# Patient Record
Sex: FEMALE | Race: BLACK OR AFRICAN AMERICAN | NOT HISPANIC OR LATINO | URBAN - METROPOLITAN AREA
[De-identification: names, ages, dates, MRNs, and addresses within clinical notes are randomized per-mention and may not be internally consistent; named-entity substitution may affect disease eponyms.]

---

## 2020-08-25 ENCOUNTER — EMERGENCY (EMERGENCY)
Facility: HOSPITAL | Age: 60
LOS: 0 days | Discharge: HOME | End: 2020-08-26
Attending: STUDENT IN AN ORGANIZED HEALTH CARE EDUCATION/TRAINING PROGRAM
Payer: MEDICAID

## 2020-08-25 VITALS
HEART RATE: 102 BPM | TEMPERATURE: 99 F | DIASTOLIC BLOOD PRESSURE: 112 MMHG | OXYGEN SATURATION: 98 % | SYSTOLIC BLOOD PRESSURE: 182 MMHG | RESPIRATION RATE: 18 BRPM

## 2020-08-25 VITALS
SYSTOLIC BLOOD PRESSURE: 190 MMHG | WEIGHT: 197.98 LBS | HEIGHT: 64 IN | RESPIRATION RATE: 17 BRPM | HEART RATE: 106 BPM | DIASTOLIC BLOOD PRESSURE: 106 MMHG | OXYGEN SATURATION: 97 % | TEMPERATURE: 99 F

## 2020-08-25 PROCEDURE — 99284 EMERGENCY DEPT VISIT MOD MDM: CPT

## 2020-08-25 NOTE — ED ADULT TRIAGE NOTE - CHIEF COMPLAINT QUOTE
sciatica related pain, lower back pain radiating to left hip/leg  hx of sciatica, dm, htn, arthritis

## 2020-08-26 ENCOUNTER — EMERGENCY (EMERGENCY)
Facility: HOSPITAL | Age: 60
LOS: 1 days | Discharge: HOME | End: 2020-08-26
Attending: EMERGENCY MEDICINE | Admitting: EMERGENCY MEDICINE
Payer: MEDICAID

## 2020-08-26 VITALS
RESPIRATION RATE: 16 BRPM | HEART RATE: 102 BPM | TEMPERATURE: 98 F | OXYGEN SATURATION: 100 % | SYSTOLIC BLOOD PRESSURE: 174 MMHG | DIASTOLIC BLOOD PRESSURE: 112 MMHG

## 2020-08-26 PROCEDURE — 99283 EMERGENCY DEPT VISIT LOW MDM: CPT | Mod: 25

## 2020-08-26 PROCEDURE — 72131 CT LUMBAR SPINE W/O DYE: CPT | Mod: 26

## 2020-08-26 PROCEDURE — 20552 NJX 1/MLT TRIGGER POINT 1/2: CPT

## 2020-08-26 RX ORDER — METHOCARBAMOL 500 MG/1
750 TABLET, FILM COATED ORAL ONCE
Refills: 0 | Status: COMPLETED | OUTPATIENT
Start: 2020-08-26 | End: 2020-08-26

## 2020-08-26 RX ORDER — MELOXICAM 15 MG/1
1 TABLET ORAL
Qty: 7 | Refills: 0
Start: 2020-08-26 | End: 2020-09-01

## 2020-08-26 RX ORDER — KETOROLAC TROMETHAMINE 30 MG/ML
15 SYRINGE (ML) INJECTION ONCE
Refills: 0 | Status: COMPLETED | OUTPATIENT
Start: 2020-08-26 | End: 2020-08-26

## 2020-08-26 RX ORDER — DEXAMETHASONE 0.5 MG/5ML
4 ELIXIR ORAL ONCE
Refills: 0 | Status: COMPLETED | OUTPATIENT
Start: 2020-08-26 | End: 2020-08-26

## 2020-08-26 RX ORDER — LIDOCAINE 4 G/100G
1 CREAM TOPICAL ONCE
Refills: 0 | Status: COMPLETED | OUTPATIENT
Start: 2020-08-26 | End: 2020-08-26

## 2020-08-26 RX ORDER — LIDOCAINE 4 G/100G
1 CREAM TOPICAL
Qty: 7 | Refills: 0
Start: 2020-08-26 | End: 2020-09-01

## 2020-08-26 RX ORDER — KETOROLAC TROMETHAMINE 30 MG/ML
30 SYRINGE (ML) INJECTION ONCE
Refills: 0 | Status: DISCONTINUED | OUTPATIENT
Start: 2020-08-26 | End: 2020-08-26

## 2020-08-26 RX ADMIN — METHOCARBAMOL 750 MILLIGRAM(S): 500 TABLET, FILM COATED ORAL at 00:49

## 2020-08-26 RX ADMIN — Medication 30 MILLIGRAM(S): at 00:52

## 2020-08-26 RX ADMIN — Medication 4 MILLIGRAM(S): at 00:50

## 2020-08-26 RX ADMIN — LIDOCAINE 1 PATCH: 4 CREAM TOPICAL at 00:50

## 2020-08-26 NOTE — ED ADULT NURSE NOTE - OBJECTIVE STATEMENT
60F aaox4 ambulatory with h/o DM and HTN, p/w c/o left buttocks pain radiating to left thigh and leg for 4 dyas now not releived by OTC pain meds. Patient denies any trauma, no chills or fever, sob, chest pain, nausea or vomiting.

## 2020-08-26 NOTE — ED PROVIDER NOTE - PHYSICAL EXAMINATION
GEN: Awake, alert, interactive, pt appears uncomfortable.  HEAD AND NECK: NC/AT. Airway patent. Neck supple.   EYES:  Clear b/l. EOMI. PERRL.   ENT: Moist mucus membranes.   CARDIAC: Regular rate, regular rhythm. No evident pedal edema.    RESP/CHEST: Normal respiratory effort with no use of accessory muscles or retractions. Clear throughout on auscultation.  ABD: soft, non-distended, non-tender. No rebound, no guarding.   BACK: Full ROM back, + TTP lumbar spine / L SI joint.  EXTREMITIES: Moving all extremities with no apparent deformities.   SKIN: Warm, dry, intact normal color. No rash.   NEURO: AOx3, CN II-XII grossly intact, 5/5 strength BUE/BLE, sensation intact, normal gait.   PSYCH: Appropriate mood and affect.

## 2020-08-26 NOTE — ED PROVIDER NOTE - OBJECTIVE STATEMENT
61yo F w/ PMH htn, dm, back issues (f/u w/ epidural for pain /injections x 2, relief for short term) pw L lower back pain w/ rad into LLE x 3 days. Pt reports heavy lifting (groceries) 5 days ago. ros otherwise neg. pt able to ambulate, no red flags / signs sx for cauda equina. pt went to PCP gave robaxin, valium, gabapentin, no relief.     pmh as above, psh hysterectomy, , meds as listed, nkda. neg social.   sciatica type pain. 61yo F w/ PMH HTN, DM, back issues (outpt prior epidural for pain / injections x 2, relief for short term) pw L lower back pain w/ rad into LLE x 3 days. Describes as shooting, sharp pain. Pt reports heavy lifting (groceries) 5 days ago. ROS otherwise neg. Pt able to ambulate. Pt went to PCP - gave Robaxin, valium, gabapentin, no relief.     pmh as above, psh hysterectomy, , meds as listed, nkda. neg social.

## 2020-08-26 NOTE — ED PROVIDER NOTE - NS ED ROS FT
CONST: no fevers, no chills, no trauma  EYES: no pain, no visual disturbances  ENT: no sore throat, no epistaxis, no rhinorrhea, no hearing changes  CV: no chest pain, no palpitations, no orthopnea, no extremity pain or swelling  RESP: no shortness of breath, no cough, no sputum, no pleurisy, no wheezing  ABD: no abdominal pain, no nausea, no vomiting, no diarrhea, no black or bloody stool  : no dysuria, no hematuria, no frequency, no urgency  MSK: + back pain w/ rad into LLE, no neck pain  NEURO: no headache, no sensory disturbances, no focal weakness, no dizziness  HEME: no easy bleeding or bruising  SKIN: no diaphoresis, no rash

## 2020-08-26 NOTE — ED PROVIDER NOTE - CLINICAL SUMMARY MEDICAL DECISION MAKING FREE TEXT BOX
61yo F w/ PMH HTN, DM, back issues pw L lower back pain w/ rad into LLE x 3 days. ROS otherwise neg. Pt able to ambulate, no red flags / signs sx for cauda equina. AFVSS. Pt appears uncomfortable, + TTP lumbar spine / L SI joint, no focal neuro deficits on exam. Plan: Obtain CT lumbar, give pain meds, re-eval. CT w/ mild bony foraminal narrowing L3-4-5. On re-eval, pt pain improved. Stable for d/c home. Clinical presentation most c/w L sciatic pain. Given outpt f/u w/ spinal surgery, if desired. Recommend f/u w/ PCP. Given script lidoderm patches, meloxicam prn severe pain. Return signs and sx d/w pt and her daughter. They understand and agree w/ this plan.

## 2020-08-26 NOTE — ED ADULT TRIAGE NOTE - CHIEF COMPLAINT QUOTE
pt brought in by ems from home, here for evaluation of left sided hip pain radiating down the leg, describes pain as burning. pt now unable to bear weight. pt was diagnosed with sciatica yesterday while in another ED with similar symptoms. Kayleigh-daughter [phone]

## 2020-08-26 NOTE — ED PROVIDER NOTE - CARE PROVIDER_API CALL
Daly Hernandez  ORTHOPAEDIC SURGERY  [address] [facility], [address] [place] [address]  Phone: [phone]  Fax: [phone]  Follow Up Time: 4-6 Days

## 2020-08-26 NOTE — ED PROVIDER NOTE - PATIENT PORTAL LINK FT
You can access the FollowMyHealth Patient Portal offered by [facility] by registering at the following website: [email or web address]. By joining [employer]’s FollowMyHealth portal, you will also be able to view your health information using other applications (apps) compatible with our system.

## 2020-08-26 NOTE — ED ADULT NURSE NOTE - CHPI ED NUR SYMPTOMS NEG
no tingling/no fever/no vomiting/no dizziness/no nausea/no decreased eating/drinking/no chills/no weakness

## 2020-08-27 VITALS
OXYGEN SATURATION: 100 % | RESPIRATION RATE: 18 BRPM | HEART RATE: 86 BPM | DIASTOLIC BLOOD PRESSURE: 64 MMHG | SYSTOLIC BLOOD PRESSURE: 167 MMHG | TEMPERATURE: 98 F
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
## 2020-08-27 DIAGNOSIS — E11.9 TYPE 2 DIABETES MELLITUS WITHOUT COMPLICATIONS: ICD-10-CM

## 2020-08-27 DIAGNOSIS — I10 ESSENTIAL (PRIMARY) HYPERTENSION: ICD-10-CM

## 2020-08-27 DIAGNOSIS — M54.5 LOW BACK PAIN: ICD-10-CM

## 2020-08-27 DIAGNOSIS — M54.42 LUMBAGO WITH SCIATICA, LEFT SIDE: ICD-10-CM

## 2020-08-27 RX ORDER — BUPIVACAINE HCL/PF 7.5 MG/ML
5 VIAL (ML) INJECTION ONCE
Refills: 0 | Status: COMPLETED | OUTPATIENT
Start: 2020-08-27 | End: 2020-08-27

## 2020-08-27 RX ORDER — DIAZEPAM 5 MG
5 TABLET ORAL ONCE
Refills: 0 | Status: DISCONTINUED | OUTPATIENT
Start: 2020-08-27 | End: 2020-08-27

## 2020-08-27 RX ORDER — LIDOCAINE 4 G/100G
1 CREAM TOPICAL ONCE
Refills: 0 | Status: COMPLETED | OUTPATIENT
Start: 2020-08-27 | End: 2020-08-27

## 2020-08-27 RX ADMIN — Medication 5 MILLILITER(S): at 04:51

## 2020-08-27 RX ADMIN — LIDOCAINE 1 PATCH: 4 CREAM TOPICAL at 05:09

## 2020-08-27 RX ADMIN — Medication 5 MILLIGRAM(S): at 04:51

## 2020-08-27 NOTE — ED PROVIDER NOTE - PROGRESS NOTE DETAILS
Melvi Mathis MD PGY-3 patient reports iproved pain. ambulating in ER. will d/c with follow up. she may need to have her gabapentin uptitrated as an outpatient

## 2020-08-27 NOTE — ED PROVIDER NOTE - OBJECTIVE STATEMENT
61 yo F with PMH HTN, DM on insulin p/w L lower back pain radiating down left leg associated with burning. Was started on roboxan and seen at [facility] for pain recently, given "two injections" and two pills (patient unsure of name, but states her daughter who is a nurse states one was a steroid). Worse pain over the last two days, now unable to ambulate. No bowel or bladder incontinence or reported saddle anesthesia.

## 2020-08-27 NOTE — ED PROVIDER NOTE - PATIENT PORTAL LINK FT
You can access the FollowMyHealth Patient Portal offered by [facility] by registering at the following website: [email or web address]. By joining [employer]’s FollowMyHealth portal, you will also be able to view your health information using other applications (apps) compatible with our system.

## 2020-08-27 NOTE — ED PROVIDER NOTE - PHYSICAL EXAMINATION
PHYSICAL EXAM:   General: appears in discomfort  HEENT: NC/AT, airway patent  Cardiovascular: regular rate  Respiratory: nonlabored respirations  Back: left lower back pain over SI joint with palpable muscle spasm, no overlying skin lesions  Neuro: awake, alert, interactive. Strength 5/5 lower extremities b/l. Sensation intact to light touch in lower extremities. +good cap refil in LLE. dorsiflexion and plantarflexion of the feet intact bilaterally  Skin: as above  -Melvi Mathis PGY-2 PHYSICAL EXAM:   General: appears in discomfort  HEENT: NC/AT, airway patent  Cardiovascular: regular rate  Respiratory: nonlabored respirations  Back: left lower back pain over SI joint with palpable muscle spasm, no overlying skin lesions  Neuro: awake, alert, interactive. Strength 5/5 RLE, difficulty lifting LLE off the bed 2/2 pain. Sensation intact to light touch in lower extremities. +good cap refil in LLE. dorsiflexion and plantarflexion of the feet intact bilaterally  Skin: as above  -Melvi Mathis PGY-2

## 2020-08-27 NOTE — ED PROVIDER NOTE - NS ED ROS FT
REVIEW OF SYSTEMS:  General:  no fever, no chills  Cardiac: no chest pain  Respiratory:  no shortness of breath  Gastrointestinal: no abdominal pain, no nausea, no vomiting, no diarrhea, no melena, no hematochezia   Genitourinary: no hematuria, no dysuria, no urinary frequency, no urinary hesitancy  Extremities: +L lower back pain radiating down the left leg  Neuro: no bowel or bladder incontinence or saddle anesthesia reported  -Melvi Mathis PGY-2

## 2020-08-27 NOTE — ED ADULT NURSE NOTE - CHIEF COMPLAINT QUOTE
pt brought in by ems from home, here for evaluation of left sided hip pain radiating down the leg, describes pain as burning. pt now unable to bear weight. pt was diagnosed with sciatica yesterday while in another ED with similar symptoms. Kayleigh-daughter [phone]

## 2020-08-27 NOTE — ED PROVIDER NOTE - NSFOLLOWUPINSTRUCTIONS_ED_ALL_ED_FT
1. You were seen in the Emergency Room for sciatica pain  2. You should continue to take all your medications as prescribed. You may need to have the dose of your gabapentin increased.   3. Please follow up with your doctor in 24-48 hours.  4. Return to the emergency room or seek immediate assistance for any new or concerning symptoms (such as fevers, chills, worsening back pain, inability to walk, unable to control your bowel or bladder ), or if you get worse.   5. Copies of your tests were provided to you for follow-up.  You must address all your findings with your doctor.

## 2020-08-27 NOTE — ED PROVIDER NOTE - CLINICAL SUMMARY MEDICAL DECISION MAKING FREE TEXT BOX
61 yo F with PMH HTN, DM on insulin p/w L lower back pain radiating down left leg associated with burning likely sciatica with nerve irritation from muscle spasm. No red flags for back pain. Will treat pain with trigger point injection and valium with lidoderm patch and re-eval. Will ensure patient is ambulatory prior to d/c

## 2020-08-27 NOTE — ED PROVIDER NOTE - ATTENDING CONTRIBUTION TO CARE
I performed a history and physical exam of the patient and discussed their management with the resident. I reviewed the resident's note and agree with the documented findings and plan of care. I have edited as appropriate. My medical decision making and observations are found above.  61 yo F with PMH HTN, DM on insulin p/w L lower back pain radiating down left leg associated with burning. Was started on roboxan and seen at [facility] for pain recently, given "two injections" and two pills (patient unsure of name, but states her daughter who is a nurse states one was a steroid). Worse pain over the last two days, now unable to ambulate. No bowel or bladder incontinence or reported saddle anesthesia.

## 2020-08-27 NOTE — ED ADULT NURSE NOTE - OBJECTIVE STATEMENT
April RN:  Pt received in spot 2, A&OX4, NAD.  c/o L leg pain since Sunday, radiating from sacrum down to L foot with tingling to toes.  Pt was seen by PMD earlier in week, placed on a muscle relaxer, unknown name, without relief.  Pt evaluated then evaluated at [facility] yesterday and given decadron without relief.  Pt states is unable to bear weight because pain is "too bad,"  but has full ROM to affected extremity, bending her knee to her chest in the stretcher without difficulty or help.  Pt already on robaxin and gabapentin for neuropathy pain. Awaiting MD musa.  Report given to primary RN.

## 2022-03-10 ENCOUNTER — EMERGENCY (EMERGENCY)
Facility: HOSPITAL | Age: 62
LOS: 0 days | Discharge: HOME | End: 2022-03-11
Attending: STUDENT IN AN ORGANIZED HEALTH CARE EDUCATION/TRAINING PROGRAM
Payer: MEDICAID

## 2022-03-10 VITALS
DIASTOLIC BLOOD PRESSURE: 76 MMHG | TEMPERATURE: 98 F | WEIGHT: 203.05 LBS | SYSTOLIC BLOOD PRESSURE: 178 MMHG | HEART RATE: 101 BPM | HEIGHT: 64 IN | OXYGEN SATURATION: 99 % | RESPIRATION RATE: 16 BRPM

## 2022-03-10 DIAGNOSIS — R74.8 ABNORMAL LEVELS OF OTHER SERUM ENZYMES: ICD-10-CM

## 2022-03-10 DIAGNOSIS — R06.02 SHORTNESS OF BREATH: ICD-10-CM

## 2022-03-10 DIAGNOSIS — R10.12 LEFT UPPER QUADRANT PAIN: ICD-10-CM

## 2022-03-10 DIAGNOSIS — Z90.710 ACQUIRED ABSENCE OF BOTH CERVIX AND UTERUS: ICD-10-CM

## 2022-03-10 DIAGNOSIS — I10 ESSENTIAL (PRIMARY) HYPERTENSION: ICD-10-CM

## 2022-03-10 DIAGNOSIS — E11.9 TYPE 2 DIABETES MELLITUS WITHOUT COMPLICATIONS: ICD-10-CM

## 2022-03-10 PROCEDURE — 99285 EMERGENCY DEPT VISIT HI MDM: CPT

## 2022-03-10 RX ORDER — FAMOTIDINE 10 MG/ML
20 INJECTION INTRAVENOUS ONCE
Refills: 0 | Status: COMPLETED | OUTPATIENT
Start: 2022-03-10 | End: 2022-03-10

## 2022-03-10 RX ADMIN — Medication 30 MILLILITER(S): at 23:25

## 2022-03-10 RX ADMIN — FAMOTIDINE 20 MILLIGRAM(S): 10 INJECTION INTRAVENOUS at 23:25

## 2022-03-10 NOTE — ED PROVIDER NOTE - NSFOLLOWUPCLINICS_GEN_ALL_ED_FT
A Gastroenterologist  Gastroenterology  .  [place]   Phone:   Fax:   Established Patient  Follow Up Time: 7-10 Days

## 2022-03-10 NOTE — ED PROVIDER NOTE - PROGRESS NOTE DETAILS
Pt w/ improvement in symptoms w/ ED meds, lipase ~900, pt reports CT AP was ~1mo ago, will add on CT AP r/o pancreatitis. Pt w/ improvement in symptoms w/ ED meds. Lipase 889. Pt reports CT AP was ~1mo ago, will add on CT AP r/o pancreatitis. CT negative for acute intra-abd pathology. Stable for d/c home. Given scripts Pepcid, Maalox. Instructed for close outpatient GI f/u, as scheduled + PCP f/u. Return signs / symptoms d/w pt. She understands and agrees w/ this plan.

## 2022-03-10 NOTE — ED PROVIDER NOTE - PATIENT PORTAL LINK FT
You can access the FollowMyHealth Patient Portal offered by [facility] by registering at the following website: [email or web address]. By joining [employer]’s FollowMyHealth portal, you will also be able to view your health information using other applications (apps) compatible with our system.

## 2022-03-10 NOTE — ED PROVIDER NOTE - CLINICAL SUMMARY MEDICAL DECISION MAKING FREE TEXT BOX
61F w/ PMH HTN, DM pw 1mo LUQ pain, worse tonight. AFVSS. Well appearing, in NAD. Plan: CBC, CMP, lipase. Give Pepcid, Maalox. Re-eval. If negative w/u, anticipate d/c home w/ instructions for close outpatient GI f/u, as scheduled.

## 2022-03-10 NOTE — ED PROVIDER NOTE - OBJECTIVE STATEMENT
61F w/ PMH HTN, DM pw LUQ pain x 1mo, worse tonight. Reports followed up w/ PCP, had outpatient CT AP done which was negative, pt had GI appt scheduled for last wk, but d/t paperwork issue, appt was moved to next wk. Pt trying mint and rubio teas w/o relief, no meds PTA. Denies previous similar pain. Pain does not radiate. Denies F/C, CP, cough, back pain, N/V/D/C, dysuria, hematuria. Pt reports SOB w/ severe pain. Pt reports moves bowels x 1 daily, regular.     PMH as above, PSH hysterectomy, NKDA, meds as listed.

## 2022-03-10 NOTE — ED ADULT NURSE NOTE - OBJECTIVE STATEMENT
Pt presents to the ED c/o of worsening upper ABD pain. Pt that pain has been going on and off for 1 month. Pt denies any N/V.

## 2022-03-11 VITALS
HEART RATE: 85 BPM | RESPIRATION RATE: 17 BRPM | OXYGEN SATURATION: 98 % | DIASTOLIC BLOOD PRESSURE: 76 MMHG | SYSTOLIC BLOOD PRESSURE: 123 MMHG

## 2022-03-11 PROBLEM — I10 ESSENTIAL (PRIMARY) HYPERTENSION: Chronic | Status: ACTIVE | Noted: 2020-08-27

## 2022-03-11 PROBLEM — E11.9 TYPE 2 DIABETES MELLITUS WITHOUT COMPLICATIONS: Chronic | Status: ACTIVE | Noted: 2020-08-27

## 2022-03-11 LAB
ALBUMIN SERPL ELPH-MCNC: 3.4 G/DL — SIGNIFICANT CHANGE UP (ref 3.3–5)
ALP SERPL-CCNC: 108 U/L — SIGNIFICANT CHANGE UP (ref 40–120)
ALT FLD-CCNC: 35 U/L — SIGNIFICANT CHANGE UP (ref 12–78)
ANION GAP SERPL CALC-SCNC: 3 MMOL/L — LOW (ref 5–17)
AST SERPL-CCNC: 20 U/L — SIGNIFICANT CHANGE UP (ref 15–37)
BILIRUB SERPL-MCNC: 0.2 MG/DL — SIGNIFICANT CHANGE UP (ref 0.2–1.2)
BUN SERPL-MCNC: 15 MG/DL — SIGNIFICANT CHANGE UP (ref 7–23)
CALCIUM SERPL-MCNC: 8.9 MG/DL — SIGNIFICANT CHANGE UP (ref 8.5–10.1)
CHLORIDE SERPL-SCNC: 106 MMOL/L — SIGNIFICANT CHANGE UP (ref 96–108)
CO2 SERPL-SCNC: 31 MMOL/L — SIGNIFICANT CHANGE UP (ref 22–31)
CREAT SERPL-MCNC: 1.07 MG/DL — SIGNIFICANT CHANGE UP (ref 0.5–1.3)
EGFR: 58 ML/MIN/1.73M2 — LOW
GLUCOSE SERPL-MCNC: 395 MG/DL — HIGH (ref 70–99)
HCT VFR BLD CALC: 35.3 % — SIGNIFICANT CHANGE UP (ref 34.5–45)
HGB BLD-MCNC: 11.6 G/DL — SIGNIFICANT CHANGE UP (ref 11.5–15.5)
LIDOCAIN IGE QN: 889 U/L — HIGH (ref 73–393)
MCHC RBC-ENTMCNC: 27.2 PG — SIGNIFICANT CHANGE UP (ref 27–34)
MCHC RBC-ENTMCNC: 32.9 G/DL — SIGNIFICANT CHANGE UP (ref 32–36)
MCV RBC AUTO: 82.7 FL — SIGNIFICANT CHANGE UP (ref 80–100)
NRBC # BLD: 0 /100 WBCS — SIGNIFICANT CHANGE UP (ref 0–0)
PLATELET # BLD AUTO: 311 K/UL — SIGNIFICANT CHANGE UP (ref 150–400)
POTASSIUM SERPL-MCNC: 3.9 MMOL/L — SIGNIFICANT CHANGE UP (ref 3.5–5.3)
POTASSIUM SERPL-SCNC: 3.9 MMOL/L — SIGNIFICANT CHANGE UP (ref 3.5–5.3)
PROT SERPL-MCNC: 7 GM/DL — SIGNIFICANT CHANGE UP (ref 6–8.3)
RBC # BLD: 4.27 M/UL — SIGNIFICANT CHANGE UP (ref 3.8–5.2)
RBC # FLD: 14.2 % — SIGNIFICANT CHANGE UP (ref 10.3–14.5)
SODIUM SERPL-SCNC: 140 MMOL/L — SIGNIFICANT CHANGE UP (ref 135–145)
WBC # BLD: 6.15 K/UL — SIGNIFICANT CHANGE UP (ref 3.8–10.5)
WBC # FLD AUTO: 6.15 K/UL — SIGNIFICANT CHANGE UP (ref 3.8–10.5)

## 2022-03-11 PROCEDURE — 74177 CT ABD & PELVIS W/CONTRAST: CPT | Mod: 26,MC

## 2022-03-11 RX ORDER — FAMOTIDINE 10 MG/ML
1 INJECTION INTRAVENOUS
Qty: 30 | Refills: 0
Start: 2022-03-11 | End: 2022-04-09

## 2023-02-20 ENCOUNTER — EMERGENCY (EMERGENCY)
Facility: HOSPITAL | Age: 63
LOS: 0 days | Discharge: HOME | End: 2023-02-20
Attending: STUDENT IN AN ORGANIZED HEALTH CARE EDUCATION/TRAINING PROGRAM
Payer: MEDICAID

## 2023-02-20 VITALS
SYSTOLIC BLOOD PRESSURE: 158 MMHG | HEART RATE: 71 BPM | RESPIRATION RATE: 18 BRPM | OXYGEN SATURATION: 97 % | DIASTOLIC BLOOD PRESSURE: 77 MMHG

## 2023-02-20 VITALS
DIASTOLIC BLOOD PRESSURE: 83 MMHG | WEIGHT: 203.05 LBS | OXYGEN SATURATION: 98 % | HEART RATE: 81 BPM | HEIGHT: 64 IN | RESPIRATION RATE: 18 BRPM | SYSTOLIC BLOOD PRESSURE: 131 MMHG | TEMPERATURE: 98 F

## 2023-02-20 DIAGNOSIS — R14.0 ABDOMINAL DISTENSION (GASEOUS): ICD-10-CM

## 2023-02-20 DIAGNOSIS — R10.11 RIGHT UPPER QUADRANT PAIN: ICD-10-CM

## 2023-02-20 DIAGNOSIS — I10 ESSENTIAL (PRIMARY) HYPERTENSION: ICD-10-CM

## 2023-02-20 DIAGNOSIS — E11.9 TYPE 2 DIABETES MELLITUS WITHOUT COMPLICATIONS: ICD-10-CM

## 2023-02-20 DIAGNOSIS — R10.13 EPIGASTRIC PAIN: ICD-10-CM

## 2023-02-20 DIAGNOSIS — K29.70 GASTRITIS, UNSPECIFIED, WITHOUT BLEEDING: ICD-10-CM

## 2023-02-20 LAB
ALBUMIN SERPL ELPH-MCNC: 3.5 G/DL — SIGNIFICANT CHANGE UP (ref 3.3–5)
ALP SERPL-CCNC: 103 U/L — SIGNIFICANT CHANGE UP (ref 40–120)
ALT FLD-CCNC: 55 U/L — SIGNIFICANT CHANGE UP (ref 12–78)
ANION GAP SERPL CALC-SCNC: 4 MMOL/L — LOW (ref 5–17)
AST SERPL-CCNC: 44 U/L — HIGH (ref 15–37)
BASOPHILS # BLD AUTO: 0.04 K/UL — SIGNIFICANT CHANGE UP (ref 0–0.2)
BASOPHILS NFR BLD AUTO: 0.7 % — SIGNIFICANT CHANGE UP (ref 0–2)
BILIRUB SERPL-MCNC: 0.3 MG/DL — SIGNIFICANT CHANGE UP (ref 0.2–1.2)
BUN SERPL-MCNC: 10 MG/DL — SIGNIFICANT CHANGE UP (ref 7–23)
CALCIUM SERPL-MCNC: 8.9 MG/DL — SIGNIFICANT CHANGE UP (ref 8.5–10.1)
CHLORIDE SERPL-SCNC: 108 MMOL/L — SIGNIFICANT CHANGE UP (ref 96–108)
CO2 SERPL-SCNC: 28 MMOL/L — SIGNIFICANT CHANGE UP (ref 22–31)
CREAT SERPL-MCNC: 0.7 MG/DL — SIGNIFICANT CHANGE UP (ref 0.5–1.3)
EGFR: 97 ML/MIN/1.73M2 — SIGNIFICANT CHANGE UP
EOSINOPHIL # BLD AUTO: 0.18 K/UL — SIGNIFICANT CHANGE UP (ref 0–0.5)
EOSINOPHIL NFR BLD AUTO: 3.2 % — SIGNIFICANT CHANGE UP (ref 0–6)
GLUCOSE SERPL-MCNC: 200 MG/DL — HIGH (ref 70–99)
HCT VFR BLD CALC: 38.1 % — SIGNIFICANT CHANGE UP (ref 34.5–45)
HGB BLD-MCNC: 12.3 G/DL — SIGNIFICANT CHANGE UP (ref 11.5–15.5)
IMM GRANULOCYTES NFR BLD AUTO: 0 % — SIGNIFICANT CHANGE UP (ref 0–0.9)
LIDOCAIN IGE QN: 186 U/L — SIGNIFICANT CHANGE UP (ref 73–393)
LYMPHOCYTES # BLD AUTO: 2.42 K/UL — SIGNIFICANT CHANGE UP (ref 1–3.3)
LYMPHOCYTES # BLD AUTO: 42.6 % — SIGNIFICANT CHANGE UP (ref 13–44)
MCHC RBC-ENTMCNC: 27.2 PG — SIGNIFICANT CHANGE UP (ref 27–34)
MCHC RBC-ENTMCNC: 32.3 G/DL — SIGNIFICANT CHANGE UP (ref 32–36)
MCV RBC AUTO: 84.3 FL — SIGNIFICANT CHANGE UP (ref 80–100)
MONOCYTES # BLD AUTO: 0.45 K/UL — SIGNIFICANT CHANGE UP (ref 0–0.9)
MONOCYTES NFR BLD AUTO: 7.9 % — SIGNIFICANT CHANGE UP (ref 2–14)
NEUTROPHILS # BLD AUTO: 2.59 K/UL — SIGNIFICANT CHANGE UP (ref 1.8–7.4)
NEUTROPHILS NFR BLD AUTO: 45.6 % — SIGNIFICANT CHANGE UP (ref 43–77)
NRBC # BLD: 0 /100 WBCS — SIGNIFICANT CHANGE UP (ref 0–0)
PLATELET # BLD AUTO: 360 K/UL — SIGNIFICANT CHANGE UP (ref 150–400)
POTASSIUM SERPL-MCNC: 4.2 MMOL/L — SIGNIFICANT CHANGE UP (ref 3.5–5.3)
POTASSIUM SERPL-SCNC: 4.2 MMOL/L — SIGNIFICANT CHANGE UP (ref 3.5–5.3)
PROT SERPL-MCNC: 7.4 GM/DL — SIGNIFICANT CHANGE UP (ref 6–8.3)
RBC # BLD: 4.52 M/UL — SIGNIFICANT CHANGE UP (ref 3.8–5.2)
RBC # FLD: 14.4 % — SIGNIFICANT CHANGE UP (ref 10.3–14.5)
SODIUM SERPL-SCNC: 140 MMOL/L — SIGNIFICANT CHANGE UP (ref 135–145)
TROPONIN I, HIGH SENSITIVITY RESULT: 5.3 NG/L — SIGNIFICANT CHANGE UP
WBC # BLD: 5.68 K/UL — SIGNIFICANT CHANGE UP (ref 3.8–10.5)
WBC # FLD AUTO: 5.68 K/UL — SIGNIFICANT CHANGE UP (ref 3.8–10.5)

## 2023-02-20 PROCEDURE — 99285 EMERGENCY DEPT VISIT HI MDM: CPT

## 2023-02-20 PROCEDURE — 93010 ELECTROCARDIOGRAM REPORT: CPT

## 2023-02-20 PROCEDURE — 76705 ECHO EXAM OF ABDOMEN: CPT | Mod: 26

## 2023-02-20 RX ORDER — FAMOTIDINE 10 MG/ML
20 INJECTION INTRAVENOUS ONCE
Refills: 0 | Status: COMPLETED | OUTPATIENT
Start: 2023-02-20 | End: 2023-02-20

## 2023-02-20 RX ADMIN — FAMOTIDINE 20 MILLIGRAM(S): 10 INJECTION INTRAVENOUS at 15:29

## 2023-02-20 NOTE — ED PROVIDER NOTE - OBJECTIVE STATEMENT
61 yo F PMH HTN, DM, gastritis on protonix, here with referral from her GI doc for ruq sono r/o cholecystitis. Pt states her GI doc already did endoscopy/colonoscopy, CT, fluoroscopy studies which were negative? Pt continues with upper abdominal discomfort and bloating with ruq pain radiating to back. Pain worse when eating and has been ongoing for months. Pt states also gets sour taste in mouth frequently. Pts EKG 67 bpm nsr without ischemic change or arrhythmia.

## 2023-02-20 NOTE — ED PROVIDER NOTE - PATIENT PORTAL LINK FT
You can access the FollowMyHealth Patient Portal offered by [facility] by registering at the following website: [email or web address]. By joining [employer]’s FollowMyHealth portal, you will also be able to view your health information using other applications (apps) compatible with our system.

## 2023-02-20 NOTE — ED ADULT NURSE NOTE - OBJECTIVE STATEMENT
Pt axox4 presents to the ED c/o sharp epigastric pain that radiates to back [id number]+ and mild constipation. Pt states that she came in today for worsening constant pain. Denies any n/v/d, fever. States her PCP gave her Protonix to help with the pain but denies any relief. States the she notices she gets more pain after she eats and feels like the food is just sitting there, which causes her to want to lay down. No allergies.

## 2023-02-20 NOTE — ED PROVIDER NOTE - CARE PROVIDER_API CALL
Maria Esther Zhong)  Gastroenterology; Internal Medicine  [address] [place] [address]  Phone: [phone]  Fax: [phone]  Follow Up Time:

## 2023-02-20 NOTE — ED PROVIDER NOTE - RESPIRATORY, MLM
- see plans as above   - C/w Coreg & Lasix   - C/w Strict I&O's.  Daily wt.    - Cardiology-Dr. Denton consulted, appreciated Breath sounds clear and equal bilaterally.

## 2023-02-20 NOTE — ED ADULT TRIAGE NOTE - CHIEF COMPLAINT QUOTE
pt c/o epigastric pain radiating to RUQ abdominal pain x 1.5 months. pt states had similar pain on LUQ abdominal pain x 3 month ago.

## 2023-02-20 NOTE — ED PROVIDER NOTE - PROGRESS NOTE DETAILS
Received patient signout from Dr. alvares.  Patient followed closely by GI for chronic abdominal pain negative outpatient studies and endoscopies sent for ultrasound. Patient in no acute distress or pain at this time requesting to eat.  US negative for acute pathology.

## 2023-02-20 NOTE — ED PROVIDER NOTE - CLINICAL SUMMARY MEDICAL DECISION MAKING FREE TEXT BOX
Pt with hx htn dm, here with months long of epigastric/upper abdominal pain and bloating and frequent gas, food regurgitation, sour taste in mouth. Already seen by GI doc and had extensive workup. Sent by GI for priyank murdock to r/o cholecystitis. Pt afebrile. Abdomen soft nd/nt. EKG done given age, weight, female sex, dm/htn hx and no ischemic changes seen. troponin neg. Pending mathieu

## 2023-02-20 NOTE — ED PROVIDER NOTE - NSFOLLOWUPINSTRUCTIONS_ED_ALL_ED_FT
1) Take tylenol or motrin for pain  2) Follow-up with your gastroenterologist (GI doctor)  3) Follow up with your primary care doctor  4) Return to the ER for worsening or concerning symptoms

## 2023-05-25 ENCOUNTER — OFFICE (OUTPATIENT)
Dept: URBAN - METROPOLITAN AREA CLINIC 92 | Facility: CLINIC | Age: 63
Setting detail: OPHTHALMOLOGY
End: 2023-05-25
Payer: COMMERCIAL

## 2023-05-25 ENCOUNTER — RX ONLY (RX ONLY)
Age: 63
End: 2023-05-25

## 2023-05-25 DIAGNOSIS — H40.053: ICD-10-CM

## 2023-05-25 DIAGNOSIS — H25.13: ICD-10-CM

## 2023-05-25 DIAGNOSIS — H40.013: ICD-10-CM

## 2023-05-25 DIAGNOSIS — H16.223: ICD-10-CM

## 2023-05-25 PROCEDURE — 92133 CPTRZD OPH DX IMG PST SGM ON: CPT | Performed by: SPECIALIST

## 2023-05-25 PROCEDURE — 92020 GONIOSCOPY: CPT | Performed by: SPECIALIST

## 2023-05-25 PROCEDURE — 76514 ECHO EXAM OF EYE THICKNESS: CPT | Performed by: SPECIALIST

## 2023-05-25 PROCEDURE — 92004 COMPRE OPH EXAM NEW PT 1/>: CPT | Performed by: SPECIALIST

## 2023-05-25 ASSESSMENT — KERATOMETRY
OS_CYLPOWER_DEGREES: 39.5
OD_CYLAXISANGLE_DEGREES: 021
OS_AXISANGLE_DEGREES: 7
OD_CYLPOWER_DEGREES: 1.25
OS_AXISANGLE2_DEGREES: 097
OD_AXISANGLE2_DEGREES: 021
OS_K2POWER_DIOPTERS: 46.50
OS_K1POWER_DIOPTERS: 007
OD_K1K2_AVERAGE: 46.375
OD_AXISANGLE_DEGREES: 111
OD_K1POWER_DIOPTERS: 45.75
OS_K1K2_AVERAGE: 26.75
OS_CYLAXISANGLE_DEGREES: 097
OD_K2POWER_DIOPTERS: 47.00

## 2023-05-25 ASSESSMENT — PACHYMETRY
OD_CT_CORRECTION: -4
OS_CT_UM: 600
OD_CT_UM: 604
OS_CT_CORRECTION: -4

## 2023-05-25 ASSESSMENT — SUPERFICIAL PUNCTATE KERATITIS (SPK)
OS_SPK: 2+
OD_SPK: 2+

## 2023-05-26 PROBLEM — H40.013 GLAUCOMA SUSPECT, LOW RISK 1-2 FACTORS; BOTH EYES: Status: ACTIVE | Noted: 2023-05-25

## 2023-05-26 ASSESSMENT — KERATOMETRY
METHOD_AUTO_MANUAL: AUTO
OS_AXISANGLE_DEGREES: 097
OD_AXISANGLE_DEGREES: 021
OS_K1POWER_DIOPTERS: 007
OD_K1POWER_DIOPTERS: 45.75
OS_K2POWER_DIOPTERS: 46.50
OD_K2POWER_DIOPTERS: 47.00

## 2023-05-26 ASSESSMENT — REFRACTION_CURRENTRX
OD_OVR_VA: 20/
OS_OVR_VA: 20/
OS_VPRISM_DIRECTION: BF
OD_CYLINDER: +0.75
OD_SPHERE: +0.75
OS_CYLINDER: +0.75
OD_ADD: +2.25
OD_VPRISM_DIRECTION: BF
OD_AXIS: 010
OS_SPHERE: +0.25
OS_AXIS: 172
OS_ADD: +2.25

## 2023-05-26 ASSESSMENT — REFRACTION_AUTOREFRACTION
OD_SPHERE: +0.50
OS_SPHERE: -0.75
OS_AXIS: 178
OS_CYLINDER: +3.75
OD_AXIS: 006
OD_CYLINDER: +2.75

## 2023-05-26 ASSESSMENT — AXIALLENGTH_DERIVED
OS_AL: 31.13
OD_AL: 21.9311

## 2023-05-26 ASSESSMENT — SPHEQUIV_DERIVED
OS_SPHEQUIV: 1.125
OD_SPHEQUIV: 1.875

## 2023-05-26 ASSESSMENT — VISUAL ACUITY
OD_BCVA: 20/50+2
OS_BCVA: 20/30+
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
## 2023-08-15 ENCOUNTER — OFFICE (OUTPATIENT)
Dept: URBAN - METROPOLITAN AREA CLINIC 92 | Facility: CLINIC | Age: 63
Setting detail: OPHTHALMOLOGY
End: 2023-08-15
Payer: MEDICARE

## 2023-08-15 DIAGNOSIS — H40.013: ICD-10-CM

## 2023-08-15 DIAGNOSIS — H25.13: ICD-10-CM

## 2023-08-15 DIAGNOSIS — E11.9: ICD-10-CM

## 2023-08-15 DIAGNOSIS — H40.053: ICD-10-CM

## 2023-08-15 PROBLEM — H02.402 PTOSIS OF EYELID, UNSPECIFIED; RIGHT EYE, LEFT EYE, BOTH EYES: Status: ACTIVE | Noted: 2023-08-15

## 2023-08-15 PROBLEM — H02.401 PTOSIS OF EYELID, UNSPECIFIED; RIGHT EYE, LEFT EYE, BOTH EYES: Status: ACTIVE | Noted: 2023-08-15

## 2023-08-15 PROBLEM — H02.403 PTOSIS OF EYELID, UNSPECIFIED; RIGHT EYE, LEFT EYE, BOTH EYES: Status: ACTIVE | Noted: 2023-08-15

## 2023-08-15 PROCEDURE — 92250 FUNDUS PHOTOGRAPHY W/I&R: CPT | Performed by: SPECIALIST

## 2023-08-15 PROCEDURE — 92083 EXTENDED VISUAL FIELD XM: CPT | Performed by: SPECIALIST

## 2023-08-15 PROCEDURE — 92012 INTRM OPH EXAM EST PATIENT: CPT | Performed by: SPECIALIST

## 2023-08-15 ASSESSMENT — KERATOMETRY
OD_AXISANGLE_DEGREES: 012
OS_K2POWER_DIOPTERS: 47.00
METHOD_AUTO_MANUAL: AUTO
OS_K1POWER_DIOPTERS: 46.00
OD_K2POWER_DIOPTERS: 46.50
OD_K1POWER_DIOPTERS: 45.50
OS_AXISANGLE_DEGREES: 171

## 2023-08-15 ASSESSMENT — REFRACTION_CURRENTRX
OS_ADD: +2.25
OD_OVR_VA: 20/
OS_OVR_VA: 20/
OD_AXIS: 106
OD_AXIS: 010
OS_VPRISM_DIRECTION: BF
OD_SPHERE: +0.75
OS_SPHERE: +1.00
OS_CYLINDER: +0.75
OD_ADD: +2.25
OD_CYLINDER: -0.75
OD_OVR_VA: 20/
OS_AXIS: 172
OD_CYLINDER: +0.75
OS_SPHERE: +0.25
OD_VPRISM_DIRECTION: BF
OS_ADD: +2.25
OS_OVR_VA: 20/
OD_SPHERE: +1.50
OS_AXIS: 081
OS_CYLINDER: -0.75
OD_ADD: +2.25
OD_VPRISM_DIRECTION: BF
OS_VPRISM_DIRECTION: BF

## 2023-08-15 ASSESSMENT — CONFRONTATIONAL VISUAL FIELD TEST (CVF)
OD_FINDINGS: FULL
OS_FINDINGS: FULL

## 2023-08-15 ASSESSMENT — TONOMETRY
OD_IOP_MMHG: 21
OS_IOP_MMHG: 21

## 2023-08-15 ASSESSMENT — REFRACTION_AUTOREFRACTION
OS_SPHERE: -1.00
OD_CYLINDER: +2.25
OD_SPHERE: +0.75
OS_AXIS: 172
OD_AXIS: 001
OS_CYLINDER: +2.75

## 2023-08-15 ASSESSMENT — SUPERFICIAL PUNCTATE KERATITIS (SPK)
OS_SPK: 2+
OD_SPK: 2+

## 2023-08-15 ASSESSMENT — AXIALLENGTH_DERIVED
OS_AL: 22.4066
OD_AL: 22.0508

## 2023-08-15 ASSESSMENT — PACHYMETRY
OD_CT_CORRECTION: -4
OS_CT_CORRECTION: -4
OD_CT_UM: 604
OS_CT_UM: 600

## 2023-08-15 ASSESSMENT — VISUAL ACUITY
OS_BCVA: 20/30-
OD_BCVA: 20/50-2

## 2023-08-15 ASSESSMENT — SPHEQUIV_DERIVED
OD_SPHEQUIV: 1.875
OS_SPHEQUIV: 0.375

## 2023-08-15 ASSESSMENT — LID POSITION - PTOSIS
OD_PTOSIS: RUL 1+
OS_PTOSIS: LUL 1+

## 2023-11-02 NOTE — ED ADULT TRIAGE NOTE - NS ED TRIAGE AVPU SCALE
meal provided/plan of care explained/po fluids offered
Alert-The patient is alert, awake and responds to voice. The patient is oriented to time, place, and person. The triage nurse is able to obtain subjective information.

## 2023-11-13 ENCOUNTER — EMERGENCY (EMERGENCY)
Facility: HOSPITAL | Age: 63
LOS: 0 days | Discharge: HOME | End: 2023-11-13
Payer: MEDICARE

## 2023-11-13 VITALS
HEIGHT: 64 IN | DIASTOLIC BLOOD PRESSURE: 73 MMHG | SYSTOLIC BLOOD PRESSURE: 146 MMHG | OXYGEN SATURATION: 98 % | WEIGHT: 194.01 LBS | HEART RATE: 93 BPM

## 2023-11-13 VITALS
TEMPERATURE: 98 F | HEART RATE: 85 BPM | SYSTOLIC BLOOD PRESSURE: 126 MMHG | OXYGEN SATURATION: 100 % | RESPIRATION RATE: 16 BRPM | DIASTOLIC BLOOD PRESSURE: 81 MMHG

## 2023-11-13 DIAGNOSIS — I10 ESSENTIAL (PRIMARY) HYPERTENSION: ICD-10-CM

## 2023-11-13 DIAGNOSIS — R10.9 UNSPECIFIED ABDOMINAL PAIN: ICD-10-CM

## 2023-11-13 DIAGNOSIS — Z90.710 ACQUIRED ABSENCE OF BOTH CERVIX AND UTERUS: ICD-10-CM

## 2023-11-13 DIAGNOSIS — Z87.19 PERSONAL HISTORY OF OTHER DISEASES OF THE DIGESTIVE SYSTEM: ICD-10-CM

## 2023-11-13 DIAGNOSIS — K59.00 CONSTIPATION, UNSPECIFIED: ICD-10-CM

## 2023-11-13 DIAGNOSIS — R35.0 FREQUENCY OF MICTURITION: ICD-10-CM

## 2023-11-13 DIAGNOSIS — Z98.890 OTHER SPECIFIED POSTPROCEDURAL STATES: ICD-10-CM

## 2023-11-13 DIAGNOSIS — E11.9 TYPE 2 DIABETES MELLITUS WITHOUT COMPLICATIONS: ICD-10-CM

## 2023-11-13 LAB
ALBUMIN SERPL ELPH-MCNC: 3.4 G/DL — SIGNIFICANT CHANGE UP (ref 3.3–5)
ALBUMIN SERPL ELPH-MCNC: 3.4 G/DL — SIGNIFICANT CHANGE UP (ref 3.3–5)
ALP SERPL-CCNC: 101 U/L — SIGNIFICANT CHANGE UP (ref 40–120)
ALP SERPL-CCNC: 101 U/L — SIGNIFICANT CHANGE UP (ref 40–120)
ALT FLD-CCNC: 42 U/L — SIGNIFICANT CHANGE UP (ref 12–78)
ALT FLD-CCNC: 42 U/L — SIGNIFICANT CHANGE UP (ref 12–78)
ANION GAP SERPL CALC-SCNC: 5 MMOL/L — SIGNIFICANT CHANGE UP (ref 5–17)
ANION GAP SERPL CALC-SCNC: 5 MMOL/L — SIGNIFICANT CHANGE UP (ref 5–17)
APPEARANCE UR: CLEAR — SIGNIFICANT CHANGE UP
APPEARANCE UR: CLEAR — SIGNIFICANT CHANGE UP
AST SERPL-CCNC: 23 U/L — SIGNIFICANT CHANGE UP (ref 15–37)
AST SERPL-CCNC: 23 U/L — SIGNIFICANT CHANGE UP (ref 15–37)
BASOPHILS # BLD AUTO: 0.05 K/UL — SIGNIFICANT CHANGE UP (ref 0–0.2)
BASOPHILS # BLD AUTO: 0.05 K/UL — SIGNIFICANT CHANGE UP (ref 0–0.2)
BASOPHILS NFR BLD AUTO: 0.8 % — SIGNIFICANT CHANGE UP (ref 0–2)
BASOPHILS NFR BLD AUTO: 0.8 % — SIGNIFICANT CHANGE UP (ref 0–2)
BILIRUB SERPL-MCNC: 0.2 MG/DL — SIGNIFICANT CHANGE UP (ref 0.2–1.2)
BILIRUB SERPL-MCNC: 0.2 MG/DL — SIGNIFICANT CHANGE UP (ref 0.2–1.2)
BILIRUB UR-MCNC: NEGATIVE — SIGNIFICANT CHANGE UP
BILIRUB UR-MCNC: NEGATIVE — SIGNIFICANT CHANGE UP
BUN SERPL-MCNC: 9 MG/DL — SIGNIFICANT CHANGE UP (ref 7–23)
BUN SERPL-MCNC: 9 MG/DL — SIGNIFICANT CHANGE UP (ref 7–23)
CALCIUM SERPL-MCNC: 8.8 MG/DL — SIGNIFICANT CHANGE UP (ref 8.5–10.1)
CALCIUM SERPL-MCNC: 8.8 MG/DL — SIGNIFICANT CHANGE UP (ref 8.5–10.1)
CHLORIDE SERPL-SCNC: 104 MMOL/L — SIGNIFICANT CHANGE UP (ref 96–108)
CHLORIDE SERPL-SCNC: 104 MMOL/L — SIGNIFICANT CHANGE UP (ref 96–108)
CO2 SERPL-SCNC: 28 MMOL/L — SIGNIFICANT CHANGE UP (ref 22–31)
CO2 SERPL-SCNC: 28 MMOL/L — SIGNIFICANT CHANGE UP (ref 22–31)
COLOR SPEC: YELLOW — SIGNIFICANT CHANGE UP
COLOR SPEC: YELLOW — SIGNIFICANT CHANGE UP
CREAT SERPL-MCNC: 0.74 MG/DL — SIGNIFICANT CHANGE UP (ref 0.5–1.3)
CREAT SERPL-MCNC: 0.74 MG/DL — SIGNIFICANT CHANGE UP (ref 0.5–1.3)
DIFF PNL FLD: NEGATIVE — SIGNIFICANT CHANGE UP
DIFF PNL FLD: NEGATIVE — SIGNIFICANT CHANGE UP
EGFR: 90 ML/MIN/1.73M2 — SIGNIFICANT CHANGE UP
EGFR: 90 ML/MIN/1.73M2 — SIGNIFICANT CHANGE UP
EOSINOPHIL # BLD AUTO: 0.13 K/UL — SIGNIFICANT CHANGE UP (ref 0–0.5)
EOSINOPHIL # BLD AUTO: 0.13 K/UL — SIGNIFICANT CHANGE UP (ref 0–0.5)
EOSINOPHIL NFR BLD AUTO: 2 % — SIGNIFICANT CHANGE UP (ref 0–6)
EOSINOPHIL NFR BLD AUTO: 2 % — SIGNIFICANT CHANGE UP (ref 0–6)
GLUCOSE SERPL-MCNC: 202 MG/DL — HIGH (ref 70–99)
GLUCOSE SERPL-MCNC: 202 MG/DL — HIGH (ref 70–99)
GLUCOSE UR QL: 500 MG/DL
GLUCOSE UR QL: 500 MG/DL
HCT VFR BLD CALC: 36.6 % — SIGNIFICANT CHANGE UP (ref 34.5–45)
HCT VFR BLD CALC: 36.6 % — SIGNIFICANT CHANGE UP (ref 34.5–45)
HGB BLD-MCNC: 12 G/DL — SIGNIFICANT CHANGE UP (ref 11.5–15.5)
HGB BLD-MCNC: 12 G/DL — SIGNIFICANT CHANGE UP (ref 11.5–15.5)
IMM GRANULOCYTES NFR BLD AUTO: 0.2 % — SIGNIFICANT CHANGE UP (ref 0–0.9)
IMM GRANULOCYTES NFR BLD AUTO: 0.2 % — SIGNIFICANT CHANGE UP (ref 0–0.9)
KETONES UR-MCNC: NEGATIVE MG/DL — SIGNIFICANT CHANGE UP
KETONES UR-MCNC: NEGATIVE MG/DL — SIGNIFICANT CHANGE UP
LEUKOCYTE ESTERASE UR-ACNC: NEGATIVE — SIGNIFICANT CHANGE UP
LEUKOCYTE ESTERASE UR-ACNC: NEGATIVE — SIGNIFICANT CHANGE UP
LIDOCAIN IGE QN: 160 U/L — HIGH (ref 13–75)
LIDOCAIN IGE QN: 160 U/L — HIGH (ref 13–75)
LYMPHOCYTES # BLD AUTO: 3.19 K/UL — SIGNIFICANT CHANGE UP (ref 1–3.3)
LYMPHOCYTES # BLD AUTO: 3.19 K/UL — SIGNIFICANT CHANGE UP (ref 1–3.3)
LYMPHOCYTES # BLD AUTO: 48.2 % — HIGH (ref 13–44)
LYMPHOCYTES # BLD AUTO: 48.2 % — HIGH (ref 13–44)
MCHC RBC-ENTMCNC: 27.7 PG — SIGNIFICANT CHANGE UP (ref 27–34)
MCHC RBC-ENTMCNC: 27.7 PG — SIGNIFICANT CHANGE UP (ref 27–34)
MCHC RBC-ENTMCNC: 32.8 G/DL — SIGNIFICANT CHANGE UP (ref 32–36)
MCHC RBC-ENTMCNC: 32.8 G/DL — SIGNIFICANT CHANGE UP (ref 32–36)
MCV RBC AUTO: 84.5 FL — SIGNIFICANT CHANGE UP (ref 80–100)
MCV RBC AUTO: 84.5 FL — SIGNIFICANT CHANGE UP (ref 80–100)
MONOCYTES # BLD AUTO: 0.43 K/UL — SIGNIFICANT CHANGE UP (ref 0–0.9)
MONOCYTES # BLD AUTO: 0.43 K/UL — SIGNIFICANT CHANGE UP (ref 0–0.9)
MONOCYTES NFR BLD AUTO: 6.5 % — SIGNIFICANT CHANGE UP (ref 2–14)
MONOCYTES NFR BLD AUTO: 6.5 % — SIGNIFICANT CHANGE UP (ref 2–14)
NEUTROPHILS # BLD AUTO: 2.81 K/UL — SIGNIFICANT CHANGE UP (ref 1.8–7.4)
NEUTROPHILS # BLD AUTO: 2.81 K/UL — SIGNIFICANT CHANGE UP (ref 1.8–7.4)
NEUTROPHILS NFR BLD AUTO: 42.3 % — LOW (ref 43–77)
NEUTROPHILS NFR BLD AUTO: 42.3 % — LOW (ref 43–77)
NITRITE UR-MCNC: NEGATIVE — SIGNIFICANT CHANGE UP
NITRITE UR-MCNC: NEGATIVE — SIGNIFICANT CHANGE UP
NRBC # BLD: 0 /100 WBCS — SIGNIFICANT CHANGE UP (ref 0–0)
NRBC # BLD: 0 /100 WBCS — SIGNIFICANT CHANGE UP (ref 0–0)
PH UR: 7 — SIGNIFICANT CHANGE UP (ref 5–8)
PH UR: 7 — SIGNIFICANT CHANGE UP (ref 5–8)
PLATELET # BLD AUTO: 349 K/UL — SIGNIFICANT CHANGE UP (ref 150–400)
PLATELET # BLD AUTO: 349 K/UL — SIGNIFICANT CHANGE UP (ref 150–400)
POTASSIUM SERPL-MCNC: 3.7 MMOL/L — SIGNIFICANT CHANGE UP (ref 3.5–5.3)
POTASSIUM SERPL-MCNC: 3.7 MMOL/L — SIGNIFICANT CHANGE UP (ref 3.5–5.3)
POTASSIUM SERPL-SCNC: 3.7 MMOL/L — SIGNIFICANT CHANGE UP (ref 3.5–5.3)
POTASSIUM SERPL-SCNC: 3.7 MMOL/L — SIGNIFICANT CHANGE UP (ref 3.5–5.3)
PROT SERPL-MCNC: 7.2 GM/DL — SIGNIFICANT CHANGE UP (ref 6–8.3)
PROT SERPL-MCNC: 7.2 GM/DL — SIGNIFICANT CHANGE UP (ref 6–8.3)
PROT UR-MCNC: NEGATIVE MG/DL — SIGNIFICANT CHANGE UP
PROT UR-MCNC: NEGATIVE MG/DL — SIGNIFICANT CHANGE UP
RBC # BLD: 4.33 M/UL — SIGNIFICANT CHANGE UP (ref 3.8–5.2)
RBC # BLD: 4.33 M/UL — SIGNIFICANT CHANGE UP (ref 3.8–5.2)
RBC # FLD: 13.5 % — SIGNIFICANT CHANGE UP (ref 10.3–14.5)
RBC # FLD: 13.5 % — SIGNIFICANT CHANGE UP (ref 10.3–14.5)
SODIUM SERPL-SCNC: 137 MMOL/L — SIGNIFICANT CHANGE UP (ref 135–145)
SODIUM SERPL-SCNC: 137 MMOL/L — SIGNIFICANT CHANGE UP (ref 135–145)
SP GR SPEC: 1.02 — SIGNIFICANT CHANGE UP (ref 1–1.03)
SP GR SPEC: 1.02 — SIGNIFICANT CHANGE UP (ref 1–1.03)
TROPONIN I, HIGH SENSITIVITY RESULT: 4.2 NG/L — SIGNIFICANT CHANGE UP
TROPONIN I, HIGH SENSITIVITY RESULT: 4.2 NG/L — SIGNIFICANT CHANGE UP
UROBILINOGEN FLD QL: 0.2 MG/DL — SIGNIFICANT CHANGE UP (ref 0.2–1)
UROBILINOGEN FLD QL: 0.2 MG/DL — SIGNIFICANT CHANGE UP (ref 0.2–1)
WBC # BLD: 6.62 K/UL — SIGNIFICANT CHANGE UP (ref 3.8–10.5)
WBC # BLD: 6.62 K/UL — SIGNIFICANT CHANGE UP (ref 3.8–10.5)
WBC # FLD AUTO: 6.62 K/UL — SIGNIFICANT CHANGE UP (ref 3.8–10.5)
WBC # FLD AUTO: 6.62 K/UL — SIGNIFICANT CHANGE UP (ref 3.8–10.5)

## 2023-11-13 PROCEDURE — 74177 CT ABD & PELVIS W/CONTRAST: CPT | Mod: 26,MA

## 2023-11-13 PROCEDURE — 99285 EMERGENCY DEPT VISIT HI MDM: CPT

## 2023-11-13 RX ORDER — ACETAMINOPHEN 500 MG
1000 TABLET ORAL ONCE
Refills: 0 | Status: COMPLETED | OUTPATIENT
Start: 2023-11-13 | End: 2023-11-13

## 2023-11-13 RX ORDER — KETOROLAC TROMETHAMINE 30 MG/ML
15 SYRINGE (ML) INJECTION ONCE
Refills: 0 | Status: DISCONTINUED | OUTPATIENT
Start: 2023-11-13 | End: 2023-11-13

## 2023-11-13 RX ADMIN — Medication 15 MILLIGRAM(S): at 22:13

## 2023-11-13 RX ADMIN — Medication 400 MILLIGRAM(S): at 17:40

## 2023-11-13 RX ADMIN — Medication 1000 MILLIGRAM(S): at 18:32

## 2023-11-13 RX ADMIN — Medication 15 MILLIGRAM(S): at 22:02

## 2023-11-13 NOTE — ED PROVIDER NOTE - PHYSICAL EXAMINATION
GEN: Pt in NAD, A&O x3. GCS 15  RESP: No chest wall tenderness, CTA b/l, no wheezes, rales, or rhonchi.   CARDIAC: RRR, clear distinct S1, S2, no murmurs, gallops, or rubs.   ABD: Epigastric, LUQ, and LLQ TTP. Abdomen non-distended, soft, no rebound or guarding. No RUQ, RLQ, or Suprapubic TTP. (-) Rovsing, (-) Obturator, (-) Psoas, (-) McBurney's, (-) Brito's Sign.  VASC: 2+ radial pulses b/l. Non-pitting edema LE b/l w/o tenderness of the lower extremities.

## 2023-11-13 NOTE — ED ADULT NURSE NOTE - NSFALLUNIVINTERV_ED_ALL_ED
Bed/Stretcher in lowest position, wheels locked, appropriate side rails in place/Call bell, personal items and telephone in reach/Instruct patient to call for assistance before getting out of bed/chair/stretcher/Non-slip footwear applied when patient is off stretcher/[place] to call system/Physically safe environment - no spills, clutter or unnecessary equipment/Purposeful proactive rounding/Room/bathroom lighting operational, light cord in reach

## 2023-11-13 NOTE — ED PROVIDER NOTE - PROGRESS NOTE DETAILS
Diane Gomez PA-C: patient reassessed. endorsing improvement of symptoms. Diane Gomez PA-C: Patient states that her pain is mild at this time but would like some additional pain control Toradol ordered. Patient with capacity, ambulatory before and after visit. Discussed results and outcome of testing with the patient. Patient understands FU instructions, including prescription medication instructions, and return precautions. Patient advised to please follow up with their primary care doctor within the next 24 hours and return to the Emergency Department for worsening symptoms or any other concerns.

## 2023-11-13 NOTE — ED PROVIDER NOTE - NSFOLLOWUPCLINICS_GEN_ALL_ED_FT
Gastroenterology at [facility]  Gastroenterology  [address] [place] [address]  Phone: [phone]  Fax:      Gastroenterology at [facility]  Gastroenterology  [address] [place] [address]  Phone: [phone]  Fax:      Gastroenterology at [facility]  Gastroenterology  [address] [place] [address]  Phone: [phone]  Fax:

## 2023-11-13 NOTE — ED PROVIDER NOTE - CARE PROVIDER_API CALL
Jefry Le  Gastroenterology  [address] [facility], [address]  [place] [address]  Phone: [phone]  Fax: [phone]  Follow Up Time:    Jefry Le  Gastroenterology  [address] [facility], [address]  [place] [address]  Phone: [phone]  Fax: [phone]  Follow Up Time:    Jefry Le  Gastroenterology  [address] [facility], [address]  [place] [address]  Phone: [phone]  Fax: [phone]  Follow Up Time:

## 2023-11-13 NOTE — ED ADULT NURSE NOTE - OBJECTIVE STATEMENT
pt c/o L-flank pain radiating to LLQ constantly x 3 weeks. pt denies N/V/D.   pt seen PMD for same last friday for same.

## 2023-11-13 NOTE — ED PROVIDER NOTE - CLINICAL SUMMARY MEDICAL DECISION MAKING FREE TEXT BOX
63 yr old female with chronic back pain, DM, HTN, spinal stenosis, diverticulosis, hysterectomy 2003 w/o oophorectomy, presents c/o of worsening left sided abdominal pain for 3 weeks. No N/V/D. No back pain red flags. (+) urinary sx and constipation.   DDx: kidney stone, uti, constipation, diverticulitis.  Will order pain control and reassess. basic labs, lipase, UA, UC, trop x1 to r/o atypical ACs.  Dispo: pending results 63 yr old female with chronic back pain, DM, HTN, spinal stenosis, diverticulosis, hysterectomy 2003 w/o oophorectomy, presents c/o of worsening left sided abdominal pain for 3 weeks. No N/V/D. No back pain red flags. (+) urinary sx and constipation.   DDx: kidney stone, uti, constipation, diverticulitis.  Will order pain control and reassess. basic labs, lipase, UA, UC, trop x1 to r/o atypical ACS.  Dispo: pending results

## 2023-11-13 NOTE — ED PROVIDER NOTE - OBJECTIVE STATEMENT
63 yr old female with chronic back pain, DM, HTN, spinal stenosis, diverticulosis, hysterectomy 2003 w/o oophorectomy, presents c/o of worsening left sided abdominal pain for 3 weeks. She adds that pain starts in the back and radiates through the left flank to her left sided abdomen. Last colonoscopy in 2019. Has a pending transvaginal ultrasound and thoracic MRI outpatient. She endorses urine frequency and constipation. LBM yesterday. Has not taken pain control meds today. No recent travel or sick contacts, no hormonal/steroid use, no personal hx of DVTs/PE/Cancer, no urinary/bowel incontinence or saddle anesthesia. No HA/ Dizziness, No fever/chills, No chest pain/palpitations, SOB/cough/wheeze/stridor, No N/V/D. No neck/back pain, no rash, no changes in neurological status/function.

## 2023-11-13 NOTE — ED PROVIDER NOTE - PATIENT PORTAL LINK FT
You can access the FollowMyHealth Patient Portal offered by [facility] by registering at the following website: [email or web address]. By joining [employer]’s FollowMyHealth portal, you will also be able to view your health information using other applications (apps) compatible with our system. You can access the FollowMyHealth Patient Portal offered by [facility] by registering at the following website: [email or web address]. By joining [employer]’s FollowMyHealth portal, you will also be able to view your health information using other applications (apps) compatible with our system. You can access the FollowMyHealth Patient Portal offered by [facility] by registering at the following website: [email or web address]. By joining [employer]’s FollowMyHealth portal, you will also be able to view your health information using other applications (apps) compatible with our system.

## 2023-11-13 NOTE — ED ADULT TRIAGE NOTE - CHIEF COMPLAINT QUOTE
left flank pain radiating  to LLQ x three weeks, seen by PCP and ordered transvaginal ultrasound and thoracis MRI but unable to get appointment for tests yet

## 2023-11-13 NOTE — ED PROVIDER NOTE - NSFOLLOWUPINSTRUCTIONS_ED_ALL_ED_FT
- Please follow up with your Primary Care Doctor in 2-3 days, bring a copy of your results to follow up appointment.     - Please follow up with Gastroenterology after discharge for reevaluation and continued management.     - Please rest, stay hydrated and continue all at home medications as previously prescribed.    - For Pain  Tylenol (acetaminophen) 1000mg every 6-8 hours as needed and/or over the counter Motrin (Ibuprofen/Advil) 400-600mg every 6 hours as needed, take with food.     - Return to ED for any concern listed below:  Increased pain, fever, chills, nausea, vomiting, blood per rectum or mouth, chest pain, difficulty breathing or any new or worsened symptoms. - Please follow up with your Primary Care Doctor in 2-3 days, bring a copy of your results to follow up appointment.     - Please follow up with Gastroenterology after discharge for reevaluation and continued management:    Gastroenterology at [facility]  Gastroenterology  [address] [place] [address]  Phone: [phone]     - Please rest, stay hydrated and continue all at home medications as previously prescribed.    - For Pain  Tylenol (acetaminophen) 1000mg every 6-8 hours as needed and/or over the counter Motrin (Ibuprofen/Advil) 400-600mg every 6 hours as needed, take with food.     - Return to ED for any concern listed below:  Increased pain, fever, chills, nausea, vomiting, blood per rectum or mouth, chest pain, difficulty breathing or any new or worsened symptoms. - Please follow up with your Primary Care Doctor in 2-3 days, bring a copy of your results to follow up appointment.     - Please follow up with Gastroenterology after discharge for reevaluation and continued management:    Gastroenterology at [facility]  Gastroenterology  [address] [place] [address]  Phone: [phone]     - Please rest, stay hydrated and continue all at home medications as previously prescribed.    - For Pain  Tylenol (acetaminophen) 1000mg every 6-8 hours as needed and/or over the counter Motrin (Ibuprofen/Advil) 400-600mg every 6 hours as needed, take with food.     - Return to ED for any concern listed below:  Increased pain, fever, chills, nausea, vomiting, blood per rectum or mouth, chest pain, difficulty breathing or any new or worsened symptoms. - Please follow up with your Primary Care Doctor in 2-3 days, bring a copy of your results to follow up appointment.     - Please follow up with Gastroenterology after discharge for reevaluation and continued management:    Gastroenterology at [facility]  Gastroenterology  [address] [place] [address]  Phone: [phone]     - Please rest, stay hydrated and continue all at home medications as previously prescribed.    - For Pain  Tylenol (acetaminophen) 1000mg every 6-8 hours as needed and/or over the counter Motrin (Ibuprofen/Advil) 400-600mg every 6 hours as needed, take with food.     - Return to ED for any concern listed below:  Increased pain, fever, chills, nausea, vomiting, blood per rectum or mouth, chest pain, difficulty breathing or any new or worsened symptoms. - Please follow up with your Primary Care Doctor in 2-3 days, bring a copy of your results to follow up appointment.     - Please follow up with Gastroenterology after discharge for reevaluation and continued management:    Gastroenterology at [facility]  Gastroenterology  [address] [place] [address]  Phone: [phone]    [facility]  Gastroenterology  [address] [place] [address], [address] [address]  Phone: [phone]     - Please rest, stay hydrated and continue all at home medications as previously prescribed.    - For Pain  Tylenol (acetaminophen) 1000mg every 6-8 hours as needed and/or over the counter Motrin (Ibuprofen/Advil) 400-600mg every 6 hours as needed, take with food.     - Return to ED for any concern listed below:  Increased pain, fever, chills, nausea, vomiting, blood per rectum or mouth, chest pain, difficulty breathing or any new or worsened symptoms. - Please follow up with your Primary Care Doctor in 2-3 days, bring a copy of your results to follow up appointment.     - Please follow up with Gastroenterology after discharge for reevaluation and continued management:    Gastroenterology at [facility]  Gastroenterology  [address] [place] [address]  Phone: [phone]    [facility]  Gastroenterology  [address] [place] [address], [address] [address]  Phone: [phone]     - Please rest, stay hydrated and continue all at home medications as previously prescribed.    - For Pain  Tylenol (acetaminophen) 1000mg every 6-8 hours as needed and/or over the counter Motrin (Ibuprofen/Advil) 400-600mg every 6 hours as needed, take with food.     - Return to ED for any concern listed below:  Increased pain, fever, chills, nausea, vomiting, blood per rectum or mouth, chest pain, difficulty breathing or any new or worsened symptoms. - Please follow up with your Primary Care Doctor in 2-3 days, bring a copy of your results to follow up appointment.     - Please follow up with Gastroenterology after discharge for reevaluation and continued management:    Gastroenterology at [facility]  Gastroenterology  [address] [place] [address]  Phone: [phone]    [facility]  Gastroenterology  [address] [place] [address], [address] [address]  Phone: [phone]     - Please rest, stay hydrated and continue all at home medications as previously prescribed.    - For Pain  Tylenol (acetaminophen) 1000mg every 6-8 hours as needed and/or over the counter Motrin (Ibuprofen/Advil) 400-600mg every 6 hours as needed, take with food.     - Return to ED for any concern listed below:  Increased pain, fever, chills, nausea, vomiting, blood per rectum or mouth, chest pain, difficulty breathing or any new or worsened symptoms.

## 2023-11-14 LAB
CULTURE RESULTS: SIGNIFICANT CHANGE UP
CULTURE RESULTS: SIGNIFICANT CHANGE UP
SPECIMEN SOURCE: SIGNIFICANT CHANGE UP
SPECIMEN SOURCE: SIGNIFICANT CHANGE UP

## 2023-12-05 NOTE — ED ADULT NURSE REASSESSMENT NOTE - GENERAL PATIENT STATE
"SUBJECTIVE:   Marc is a 64 year old, presenting for the following:  Medicare Visit (Pt is fasting)        2023     7:21 AM   Additional Questions   Roomed by         Are you in the first 12 months of your Medicare coverage?  No      Annual wellness visit completed.  Risk questionnaire reviewed in detail.  Suboptimal diet.  Hearing loss bilateral sensorineural now with hearing aids in place.  Type 2 diabetes continuing Lantus 60 units daily, metformin 1000 g twice daily and Trulicity 4.5 mg/week.  Aspirin 325 mg daily continuing.  Lisinopril 20 mg daily for hypertension.  Prior microalbumin screen normal 2023.  Simvastatin 40 mg at bedtime for lipid management.  Gabapentin 600 mg 3 times daily for peripheral neuropathy.  Needs refill on sildenafil sent to local pharmacy.  Lymphedema improved with Lasix 40 mg typically half tablet in the morning.  Needs updated prescription for compression stockings.  CPAP for GUERA management.  Prior tubular adenoma of colon with colonoscopy 2023 with 5-year follow-up recommendation.  Qvar using 2 puffs twice daily consistently for asthma which has been excellent control and not requiring albuterol MDI.  Qvar dosing 80 mcg per ACT.  Needs to complete healthcare directives.  Comprehensive review of systems as above otherwise all negative.       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 Thompson); right inguinal hernia age 4 months; right total hip arthroplasty at [facility] 2019 with Dr. Evans.  Hospitalizations: early 20s \"infected gallbladder\" WITHOUT surgery... ; cellulitis in legs (hospitalized twice); abdominal wall cyst requiring IV " comfortable appearance/cooperative/improvement verbalized "antibiotics x 4 days... ; right Lumbar 3 - Lumbar 4 hemilaminectomy and medial facetectomy and Right redo Lumbar 4 -Lumbar 5 hemilaminectomy and medial facetectomy 1/20/21 (Dr. Lopez)    4/7/19-4/12/19 [facility] for right LE cellulitis/sepsis; right LE cellulitis 9/8/19-9/9/19 ([facility]); 10/15/19 s/p left MELITA (Dr. 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 He was provided with information on recommended healthy diet options.  The patient was provided with written information regarding signs of hearing loss.      Healthy Habits:     In general, how would you rate your overall health?  Good    Frequency of exercise:  4-5 days/week    Duration of exercise:  45-60 minutes    Do you usually eat at least 4 servings of fruit and vegetables a day, include whole grains    & fiber and avoid regularly eating high fat or \"junk\" foods?  No    Taking medications regularly:  Yes    Medication side effects:  Not applicable    Ability to successfully perform activities of daily living:  No assistance needed    Home Safety:  No safety concerns identified    Hearing Impairment:  Difficulty following a conversation in a noisy restaurant or crowded room, need to ask people to speak up or repeat themselves, find that men's voices are easier to understand than woman's and difficulty understanding soft or whispered speech    In the past 6 months, have you been bothered by leaking of urine?  No    In general, how would you rate your overall mental or emotional health?  Excellent    Additional concerns today:  Yes          Have you ever done Advance Care Planning? (For example, a Health Directive, POLST, or a discussion with a medical provider or your loved ones about your wishes): No, advance care " planning information given to patient to review.  Advanced care planning was discussed at today's visit.       Fall risk  Fallen 2 or more times in the past year?: No  Any fall with injury in the past year?: No    Cognitive Screening   1) Repeat 3 items (Leader, Season, Table)    2) Clock draw: NORMAL  3) 3 item recall: Recalls 2 objects   Results: NORMAL clock, 1-2 items recalled: COGNITIVE IMPAIRMENT LESS LIKELY    Mini-CogTM Copyright ERIN Bowen. Licensed by the author for use in [facility]; reprinted with permission (maria [email or web address]). All rights reserved.      Do you have sleep apnea, excessive snoring or daytime drowsiness? : yes    Reviewed and updated as needed this visit by clinical staff   Tobacco  Allergies  Meds  Problems             Reviewed and updated as needed this visit by Provider    Allergies  Meds  Problems            Social History     Tobacco Use    Smoking status: Former    Smokeless tobacco: Never   Substance Use Topics    Alcohol use: Yes     Alcohol/week: 1.0 standard drink of alcohol     Comment: Alcoholic Drinks/day: occasional             12/5/2023     4:12 AM   Alcohol Use   Prescreen: >3 drinks/day or >7 drinks/week? No          No data to display              Do you have a current opioid prescription? No  Do you use any other controlled substances or medications that are not prescribed by a provider? None              Current providers sharing in care for this patient include:   Patient Care Team:  Josh Shafer MD as PCP - General  Josh Shafer MD as Assigned PCP  Nico Bernard MD as Assigned Sleep Provider  Arcenio Fernandez MD as Assigned Surgical Provider    The following health maintenance items are reviewed in Epic and correct as of today:  Health Maintenance   Topic Date Due    LUNG CANCER SCREENING  Never done    RSV VACCINE (Pregnancy & 60+) (1 - 1-dose 60+ series) Never done    AORTIC ANEURYSM SCREENING (SYSTEM  ASSIGNED)  Never done    DIABETIC FOOT EXAM  11/16/2022    ASTHMA ACTION PLAN  11/16/2022    COVID-19 Vaccine (6 - 2023-24 season) 09/01/2023    EYE EXAM  12/01/2023    ASTHMA CONTROL TEST  02/01/2024    MICROALBUMIN  03/30/2024    ANNUAL REVIEW OF HM ORDERS  03/30/2024    A1C  06/05/2024    BMP  08/01/2024    LIPID  08/01/2024    MEDICARE ANNUAL WELLNESS VISIT  12/05/2024    FALL RISK ASSESSMENT  12/05/2024    DTAP/TDAP/TD IMMUNIZATION (3 - Td or Tdap) 07/11/2027    COLORECTAL CANCER SCREENING  02/16/2028    ADVANCE CARE PLANNING  12/05/2028    HEPATITIS C SCREENING  Completed    PHQ-2 (once per calendar year)  Completed    INFLUENZA VACCINE  Completed    Pneumococcal Vaccine: 65+ Years  Completed    ZOSTER IMMUNIZATION  Completed    IPV IMMUNIZATION  Aged Out    HPV IMMUNIZATION  Aged Out    MENINGITIS IMMUNIZATION  Aged Out    RSV MONOCLONAL ANTIBODY  Aged Out     Lab work is in process  Labs reviewed in EPIC  BP Readings from Last 3 Encounters:   12/05/23 110/60   08/01/23 120/80   03/30/23 126/64    Wt Readings from Last 3 Encounters:   12/05/23 114.3 kg (252 lb)   08/01/23 117.5 kg (259 lb)   05/03/23 115.7 kg (255 lb)                  Patient Active Problem List   Diagnosis    Diverticulosis    Microcytic anemia    Hammer Toe Of The Right Second Toe    Tubular adenoma of colon    Insulin dependent diabetes mellitus    Class 2 severe obesity due to excess calories with serious comorbidity and body mass index (BMI) of 36.0 to 36.9 in adult (H)    Carpal Tunnel Syndrome    Essential hypertension, benign    Hyperlipidemia    Male Erectile Disorder    Lymphedema    Mild persistent asthma without complication    Tinea cruris    Spinal stenosis of lumbar region, unspecified whether neurogenic claudication present    Primary osteoarthritis of right hip    Uncontrolled type 2 diabetes mellitus with hyperglycemia, with long-term current use of insulin (H)    Diabetic neuropathy associated with type 2 diabetes mellitus  (H)    History of cellulitis    History of MRSA infection    Osteoarthritis of spine with radiculopathy, lumbar region    Spondylolisthesis of lumbar region    Lumbar radiculopathy    GUERA (obstructive sleep apnea)    History of left hip replacement    Infected prosthetic hip, initial encounter     Severe sepsis (H)    Polyp of colon    History of colonic polyps     Past Surgical History:   Procedure Laterality Date    BACK SURGERY      laminectomy L45 by Dr. Villarreal     HERNIA REPAIR Right     inguinal; child    TOTAL HIP ARTHROPLASTY Bilateral 2019    Feb 5, 2019 (right) and October 19, 2019 (left)    ZZC HARE W/O FACETEC FORAMOT/DSKC 1/2 VRT SEG, LUMBAR Right 1/20/2021    Procedure: Right Lumbar 3 - Lumbar 4 hemilaminectomy and medial facetectomy and Right redo Lumbar 4 -Lumbar 5 hemilaminectomy and medial facetectomy;  Surgeon: Julissa Lopez MD;  Location: [facility];  Service: Spine       Social History     Tobacco Use    Smoking status: Former    Smokeless tobacco: Never   Substance Use Topics    Alcohol use: Yes     Alcohol/week: 1.0 standard drink of alcohol     Comment: Alcoholic Drinks/day: occasional     Family History   Problem Relation Age of Onset    Coronary Artery Disease Mother     Obesity Mother     Heart Disease Father     Cancer Father         throat    Coronary Artery Disease Father     Diabetes Sister     Diabetes Brother          Current Outpatient Medications   Medication Sig Dispense Refill    albuterol (PROAIR HFA/PROVENTIL HFA/VENTOLIN HFA) 108 (90 Base) MCG/ACT inhaler INHALE 2 PUFFS INTO THE LUNGS 4 TIMES DAILY AS NEEDED FOR SHORTNESS OF BREATH / DYSPNEA OR WHEEZING 18 g 6    APPLE CIDER VINEGAR PO Take 1 tablet by mouth      aspirin (ASA) 325 MG EC tablet       augmented betamethasone dipropionate (DIPROLENE-AF) 0.05 % external ointment APPLY TO AFFECTED AREA TWICE A DAY 15 g 3    blood glucose (NO BRAND SPECIFIED) lancets standard [employer] Microlet MISC  Check blood sugar 2  times per day and as needed      chlorhexidine (HIBICLENS) 4 % liquid Wash groin daily 473 mL 11    Cholecalciferol (D3 DOTS) 50 MCG (2000 UT) TBDP Take 1 capsule by mouth      clindamycin-benzoyl peroxide (BENZACLIN) 1-5 % external gel Apply topically 2 times daily 50 g 1    clotrimazole-betamethasone (LOTRISONE) 1-0.05 % external cream Apply topically 2 times daily 50 g 1    doxycycline monohydrate (MONODOX) 50 MG capsule Take 1 capsule (50 mg) by mouth daily 60 capsule 6    Dulaglutide (TRULICITY) 4.5 MG/0.5ML SOPN Inject 4.5 mg Subcutaneous once a week 6 mL 3    furosemide (LASIX) 40 MG tablet TAKE 0.5 TABLETS BY MOUTH DAILY 45 tablet 3    gabapentin (NEURONTIN) 300 MG capsule Take 2 capsules (600 mg) by mouth 3 times daily 540 capsule 3    insulin glargine (LANTUS SOLOSTAR) 100 UNIT/ML pen INJECT 60 UNITS SUBCUTANEOUSLY AT BEDTIME 45 mL 3    insulin pen needle (32G X 4 MM) 32G X 4 MM miscellaneous Use one pen needle daily or as directed. 100 each 3    lisinopril (ZESTRIL) 20 MG tablet Take 1 tablet (20 mg) by mouth daily 90 tablet 3    Magnesium Oxide 500 MG TABS Take 500 mg by mouth      metFORMIN (GLUCOPHAGE XR) 500 MG 24 hr tablet TAKE 2 TABLETS BY MOUTH TWICE A DAY WITH MEALS 360 tablet 0    Multiple Vitamin (MULTIVITAMIN ADULT PO) Take 1 tablet by mouth      potassium gluconate 2.5 MEQ TABS Take 1 tablet by mouth      QVAR REDIHALER 80 MCG/ACT inhaler INHALE 2 PUFFS BY MOUTH TWICE A DAY - RINSE MOUTH AFTER USE-DO NOT SHAKE UNIT 31.8 g 0    sildenafil (REVATIO) 20 MG tablet Take 2-3 tablets (40-60 mg) by mouth daily as needed (male erectile dysfunction) 90 tablet 3    sildenafil (VIAGRA) 100 MG tablet Take 0.5-1 tablets ( mg) by mouth daily as needed (erectile difficulties) 90 tablet 3    simvastatin (ZOCOR) 40 MG tablet TAKE 1 TABLET BY MOUTH EVERYDAY AT BEDTIME 90 tablet 3    vitamin (B COMPLEX) capsule Take 1 capsule by mouth      vitamin C (ASCORBIC ACID) 500 MG tablet Take 500 mg by mouth    "    Allergies   Allergen Reactions    Amoxicillin Hives     Recent Labs   Lab Test 12/05/23  0734 08/01/23  0753 03/30/23  0916 03/30/23  0913 11/25/22  0839 03/16/22  1452 11/16/21  0714 07/01/21  1046 03/11/21  0638   A1C 6.9* 8.1* 7.6*  --  7.1*   < > 8.8* 8.4*  --    LDL  --  58  --   --  70  --  71  --   --    HDL  --  34*  --   --  40  --  34*  --   --    TRIG  --  236*  --   --  155*  --  137  --   --    ALT  --  35  --   --  35  --  39  --   --    CR  --  0.93  --  0.85 0.98   < > 1.12 0.81 0.8   GFRESTIMATED  --  >90  --  >90 86   < > 69 >60 >60   GFRESTBLACK  --   --   --   --   --   --   --  >60 >60   POTASSIUM  --  4.7  --  4.4 5.2   < > 4.7 4.8  --     < > = values in this interval not displayed.      Needs HD flu and updated COVID booster        Review of Systems   Constitutional:  Negative for chills and fever.   HENT:  Positive for hearing loss. Negative for congestion, ear pain and sore throat.    Eyes:  Negative for pain and visual disturbance.   Respiratory:  Negative for cough and shortness of breath.    Cardiovascular:  Negative for chest pain, palpitations and peripheral edema.   Gastrointestinal:  Negative for abdominal pain, constipation, diarrhea, heartburn, hematochezia and nausea.   Genitourinary:  Positive for impotence and urgency. Negative for dysuria, frequency, genital sores, hematuria and penile discharge.   Musculoskeletal:  Negative for arthralgias, joint swelling and myalgias.   Skin:  Negative for rash.   Neurological:  Negative for dizziness, weakness, headaches and paresthesias.   Psychiatric/Behavioral:  Negative for mood changes. The patient is not nervous/anxious.      Constitutional, HEENT, cardiovascular, pulmonary, GI, , musculoskeletal, neuro, skin, endocrine and psych systems are negative, except as otherwise noted.    OBJECTIVE:   /60   Pulse 90   Temp 97.6  F (36.4  C)   Resp 16   Ht 1.759 m (5' 9.25\")   Wt 114.3 kg (252 lb)   SpO2 98%   BMI 36.95 kg/m  " " Estimated body mass index is 36.95 kg/m  as calculated from the following:    Height as of this encounter: 1.759 m (5' 9.25\").    Weight as of this encounter: 114.3 kg (252 lb).    Physical Exam  GENERAL: healthy, alert and no distress.  BMI 36.95.  EYES: Eyes grossly normal to inspection, PERRL and conjunctivae and sclerae normal  HENT: ear canals and TM's normal, nose and mouth without ulcers or lesions  NECK: no adenopathy, no asymmetry, masses, or scars and thyroid normal to palpation  RESP: lungs clear to auscultation - no rales, rhonchi or wheezes  CV: regular rate and rhythm, normal S1 S2, no S3 or S4, no murmur, click or rub, no peripheral edema and peripheral pulses strong  ABDOMEN: soft, nontender, no hepatosplenomegaly, no masses and bowel sounds normal   (male): normal male genitalia without lesions or urethral discharge, no hernia  RECTAL: normal sphincter tone, no rectal masses, prostate normal size, smooth, nontender without nodules or masses  MS: no gross musculoskeletal defects noted, no edema  SKIN: no suspicious lesions or rashes  NEURO: Normal strength and tone, mentation intact and speech normal  PSYCH: mentation appears normal, affect normal/bright    Diagnostic Test Results:  Labs reviewed in Epic  Results for orders placed or performed in visit on 12/05/23 (from the past 24 hour(s))   Hemoglobin A1c   Result Value Ref Range    Hemoglobin A1C 6.9 (H) 0.0 - 5.6 %   Extra Tube    Narrative    The following orders were created for panel order Extra Tube.  Procedure                               Abnormality         Status                     ---------                               -----------         ------                     Extra Red Top Tube[[id number]]                               In process                 Extra Green Top (Lithium...[[id number]]                      In process                 Extra Purple Top Tube[[id number]]                            In process                   Please " view results for these tests on the individual orders.       ASSESSMENT / PLAN:     Encounter for Medicare annual wellness exam  Annual wellness visit completed.  Risk questionnaire reviewed in detail.  Suboptimal diet.  Hearing loss sensorineural requiring bilateral hearing aids with benefit described.  Annual wellness visits to continue.    Type 2 diabetes mellitus without complication, with long-term current use of insulin (H)  Type 2 diabetes.  Trulicity 4.5 mg/week plus metformin 1000 mg twice daily and Lantus 60 units daily.  No hypoglycemic episodes.  Microalbumin screen was normal March 30, 2023.  Prior eye exam December 10, 2022 and referral placed to sample eye clinic locally for repeat eye exam.  Peripheral neuropathy historically noted with abnormal monofilament testing noted.  Repeat A1c at follow-up in 4 months with improvement noted from 8.1% down to 6.9% since recent dose adjustment of Trulicity from 3 mg up to 4.5 mg/week.  - Adult Eye  Referral  - Hemoglobin A1c  - Basic metabolic panel  - Basic metabolic panel    Essential hypertension, benign  Hypertension appears stable on lisinopril 20 mg daily.  - Basic metabolic panel  - Basic metabolic panel    Hyperlipidemia, unspecified hyperlipidemia type  Simvastatin 40 mg at bedtime for lipid management.    Mild persistent asthma without complication  Qvar 80 mcg using 2 puffs twice daily.  Not requiring albuterol MDI.    Male Erectile Disorder  New prescription for sildenafil 100 mg use half tablet to 1 tablet daily as needed sent to local [facility] pharmacy.  Good Rx coupon for $39.06 for 90 tablets.  - sildenafil (VIAGRA) 100 MG tablet  Dispense: 90 tablet; Refill: 3    Class 2 severe obesity due to excess calories with serious comorbidity and body mass index (BMI) of 36.0 to 36.9 in adult (H)  Dietary and exercise modification for weight goal less than 240 pounds initially, less than 220 pounds ideally.    Diabetic polyneuropathy associated with  "type 2 diabetes mellitus (H)  Benefits of gabapentin 600 mg 3 times daily.    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  - CBC with platelets  - CBC with platelets    Tubular adenoma of colon  History of tubular adenoma prior colonoscopy February 16, 2023 and will repeat at 5-year interval.    GUERA on CPAP  CPAP for GUERA management.    Encounter for immunization  High-dose flu shot and COVID updated booster provided today.  We will receive RSV vaccination through local pharmacy.  - INFLUENZA VACCINE 65+ (FLUZONE HD)  - COVID-19 12+ (2023-24) (PFIZER)    Screening for prostate cancer  PSA for prostate cancer screening based on age criteria.  - Prostate Specific Antigen Screen  - Prostate Specific Antigen Screen    Essential (primary) hypertension  New prescription for lisinopril 90-day supply provided.  - lisinopril (ZESTRIL) 20 MG tablet  Dispense: 90 tablet; Refill: 3    Peripheral polyneuropathy  New prescription for gabapentin 90-day supply provided.  - gabapentin (NEURONTIN) 300 MG capsule  Dispense: 540 capsule; Refill: 3    Lymphedema  Compression stockings with DME order provided.  Continues furosemide 40 mg using half tablet each morning.  - Orthotics and Prosthetics DME Compression; Leg; Knee; Bilateral; 20/30 mmHg; 12 Pair         Patient has been advised of split billing requirements and indicates understanding: Yes      COUNSELING:  Reviewed preventive health counseling, as reflected in patient instructions       Regular exercise       Healthy diet/nutrition       Vision screening       Hearing screening       Dental care       Bladder control       Fall risk prevention       Aspirin prophylaxis        Colon cancer screening       Prostate cancer screening      BMI:   Estimated body mass index is 36.95 kg/m  as calculated from the following:    Height as of this encounter: 1.759 m (5' 9.25\").    Weight as of this encounter: 114.3 kg (252 lb).   Weight management plan: " Discussed healthy diet and exercise guidelines      He reports that he has quit smoking. He has never used smokeless tobacco.      Appropriate preventive services were discussed with this patient, including applicable screening as appropriate for fall prevention, nutrition, physical activity, Tobacco-use cessation, weight loss and cognition.  Checklist reviewing preventive services available has been given to the patient.    Reviewed patients plan of care and provided an AVS. The Intermediate Care Plan ( asthma action plan, low back pain action plan, and migraine action plan) for Leandro meets the Care Plan requirement. This Care Plan has been established and reviewed with the Patient.          Josh Shafer MD  [facility]    Identified Health Risks:  I have reviewed Opioid Use Disorder and Substance Use Disorder risk factors and made any needed referrals. DME (Durable Medical Equipment) Orders and Documentation  Orders Placed This Encounter   Procedures    Orthotics and Prosthetics DME Compression; Leg; Knee; Bilateral; 20/30 mmHg; 12 Pair        The patient was assessed and it was determined the patient is in need of the following listed DME Supplies/Equipment. Please complete supporting documentation below to demonstrate medical necessity.

## 2024-03-24 ENCOUNTER — NON-APPOINTMENT (OUTPATIENT)
Age: 64
End: 2024-03-24

## 2024-03-25 ENCOUNTER — INPATIENT (INPATIENT)
Facility: HOSPITAL | Age: 64
LOS: 1 days | Discharge: HOME | End: 2024-03-27
Attending: INTERNAL MEDICINE | Admitting: INTERNAL MEDICINE
Payer: MEDICARE

## 2024-03-25 VITALS
HEART RATE: 66 BPM | HEIGHT: 64 IN | DIASTOLIC BLOOD PRESSURE: 78 MMHG | WEIGHT: 197.09 LBS | RESPIRATION RATE: 18 BRPM | TEMPERATURE: 98 F | OXYGEN SATURATION: 100 % | SYSTOLIC BLOOD PRESSURE: 156 MMHG

## 2024-03-25 PROBLEM — M54.9 DORSALGIA, UNSPECIFIED: Chronic | Status: ACTIVE | Noted: 2023-11-13

## 2024-03-25 LAB
ALBUMIN SERPL ELPH-MCNC: 3.4 G/DL — SIGNIFICANT CHANGE UP (ref 3.3–5)
ALP SERPL-CCNC: 88 U/L — SIGNIFICANT CHANGE UP (ref 40–120)
ALT FLD-CCNC: 49 U/L — SIGNIFICANT CHANGE UP (ref 12–78)
ANION GAP SERPL CALC-SCNC: 4 MMOL/L — LOW (ref 5–17)
APTT BLD: 28.2 SEC — SIGNIFICANT CHANGE UP (ref 24.5–35.6)
AST SERPL-CCNC: 41 U/L — HIGH (ref 15–37)
BASOPHILS # BLD AUTO: 0.05 K/UL — SIGNIFICANT CHANGE UP (ref 0–0.2)
BASOPHILS NFR BLD AUTO: 0.7 % — SIGNIFICANT CHANGE UP (ref 0–2)
BILIRUB SERPL-MCNC: 0.3 MG/DL — SIGNIFICANT CHANGE UP (ref 0.2–1.2)
BUN SERPL-MCNC: 12 MG/DL — SIGNIFICANT CHANGE UP (ref 7–23)
CALCIUM SERPL-MCNC: 9.4 MG/DL — SIGNIFICANT CHANGE UP (ref 8.5–10.1)
CHLORIDE SERPL-SCNC: 108 MMOL/L — SIGNIFICANT CHANGE UP (ref 96–108)
CO2 SERPL-SCNC: 29 MMOL/L — SIGNIFICANT CHANGE UP (ref 22–31)
CREAT SERPL-MCNC: 0.79 MG/DL — SIGNIFICANT CHANGE UP (ref 0.5–1.3)
EGFR: 83 ML/MIN/1.73M2 — SIGNIFICANT CHANGE UP
EOSINOPHIL # BLD AUTO: 0.11 K/UL — SIGNIFICANT CHANGE UP (ref 0–0.5)
EOSINOPHIL NFR BLD AUTO: 1.5 % — SIGNIFICANT CHANGE UP (ref 0–6)
GLUCOSE BLDC GLUCOMTR-MCNC: 243 MG/DL — HIGH (ref 70–99)
GLUCOSE SERPL-MCNC: 162 MG/DL — HIGH (ref 70–99)
HCT VFR BLD CALC: 33.9 % — LOW (ref 34.5–45)
HGB BLD-MCNC: 11.5 G/DL — SIGNIFICANT CHANGE UP (ref 11.5–15.5)
IMM GRANULOCYTES NFR BLD AUTO: 0.1 % — SIGNIFICANT CHANGE UP (ref 0–0.9)
INR BLD: 0.9 RATIO — SIGNIFICANT CHANGE UP (ref 0.85–1.18)
LYMPHOCYTES # BLD AUTO: 3.17 K/UL — SIGNIFICANT CHANGE UP (ref 1–3.3)
LYMPHOCYTES # BLD AUTO: 42.6 % — SIGNIFICANT CHANGE UP (ref 13–44)
MCHC RBC-ENTMCNC: 28.5 PG — SIGNIFICANT CHANGE UP (ref 27–34)
MCHC RBC-ENTMCNC: 33.9 G/DL — SIGNIFICANT CHANGE UP (ref 32–36)
MCV RBC AUTO: 83.9 FL — SIGNIFICANT CHANGE UP (ref 80–100)
MONOCYTES # BLD AUTO: 0.6 K/UL — SIGNIFICANT CHANGE UP (ref 0–0.9)
MONOCYTES NFR BLD AUTO: 8.1 % — SIGNIFICANT CHANGE UP (ref 2–14)
NEUTROPHILS # BLD AUTO: 3.51 K/UL — SIGNIFICANT CHANGE UP (ref 1.8–7.4)
NEUTROPHILS NFR BLD AUTO: 47 % — SIGNIFICANT CHANGE UP (ref 43–77)
NRBC # BLD: 0 /100 WBCS — SIGNIFICANT CHANGE UP (ref 0–0)
PLATELET # BLD AUTO: 325 K/UL — SIGNIFICANT CHANGE UP (ref 150–400)
POTASSIUM SERPL-MCNC: 3.8 MMOL/L — SIGNIFICANT CHANGE UP (ref 3.5–5.3)
POTASSIUM SERPL-SCNC: 3.8 MMOL/L — SIGNIFICANT CHANGE UP (ref 3.5–5.3)
PROT SERPL-MCNC: 7.4 GM/DL — SIGNIFICANT CHANGE UP (ref 6–8.3)
PROTHROM AB SERPL-ACNC: 10.8 SEC — SIGNIFICANT CHANGE UP (ref 9.5–13)
RBC # BLD: 4.04 M/UL — SIGNIFICANT CHANGE UP (ref 3.8–5.2)
RBC # FLD: 13.7 % — SIGNIFICANT CHANGE UP (ref 10.3–14.5)
SODIUM SERPL-SCNC: 141 MMOL/L — SIGNIFICANT CHANGE UP (ref 135–145)
TROPONIN I, HIGH SENSITIVITY RESULT: 5.9 NG/L — SIGNIFICANT CHANGE UP
TROPONIN I, HIGH SENSITIVITY RESULT: 6.6 NG/L — SIGNIFICANT CHANGE UP
WBC # BLD: 7.45 K/UL — SIGNIFICANT CHANGE UP (ref 3.8–10.5)
WBC # FLD AUTO: 7.45 K/UL — SIGNIFICANT CHANGE UP (ref 3.8–10.5)

## 2024-03-25 PROCEDURE — 99285 EMERGENCY DEPT VISIT HI MDM: CPT

## 2024-03-25 PROCEDURE — 71045 X-RAY EXAM CHEST 1 VIEW: CPT | Mod: 26

## 2024-03-25 PROCEDURE — 99223 1ST HOSP IP/OBS HIGH 75: CPT

## 2024-03-25 PROCEDURE — 93010 ELECTROCARDIOGRAM REPORT: CPT

## 2024-03-25 RX ORDER — ONDANSETRON 8 MG/1
4 TABLET, FILM COATED ORAL EVERY 8 HOURS
Refills: 0 | Status: DISCONTINUED | OUTPATIENT
Start: 2024-03-25 | End: 2024-03-27

## 2024-03-25 RX ORDER — GLUCAGON INJECTION, SOLUTION 0.5 MG/.1ML
1 INJECTION, SOLUTION SUBCUTANEOUS ONCE
Refills: 0 | Status: DISCONTINUED | OUTPATIENT
Start: 2024-03-25 | End: 2024-03-27

## 2024-03-25 RX ORDER — DEXTROSE 50 % IN WATER 50 %
25 SYRINGE (ML) INTRAVENOUS ONCE
Refills: 0 | Status: DISCONTINUED | OUTPATIENT
Start: 2024-03-25 | End: 2024-03-27

## 2024-03-25 RX ORDER — ASPIRIN/CALCIUM CARB/MAGNESIUM 324 MG
81 TABLET ORAL DAILY
Refills: 0 | Status: DISCONTINUED | OUTPATIENT
Start: 2024-03-25 | End: 2024-03-27

## 2024-03-25 RX ORDER — LANOLIN ALCOHOL/MO/W.PET/CERES
3 CREAM (GRAM) TOPICAL AT BEDTIME
Refills: 0 | Status: DISCONTINUED | OUTPATIENT
Start: 2024-03-25 | End: 2024-03-27

## 2024-03-25 RX ORDER — ACETAMINOPHEN 500 MG
650 TABLET ORAL EVERY 6 HOURS
Refills: 0 | Status: DISCONTINUED | OUTPATIENT
Start: 2024-03-25 | End: 2024-03-27

## 2024-03-25 RX ORDER — INSULIN LISPRO 100/ML
VIAL (ML) SUBCUTANEOUS
Refills: 0 | Status: DISCONTINUED | OUTPATIENT
Start: 2024-03-25 | End: 2024-03-27

## 2024-03-25 RX ORDER — SODIUM CHLORIDE 9 MG/ML
1000 INJECTION, SOLUTION INTRAVENOUS
Refills: 0 | Status: DISCONTINUED | OUTPATIENT
Start: 2024-03-25 | End: 2024-03-27

## 2024-03-25 RX ORDER — DEXTROSE 50 % IN WATER 50 %
15 SYRINGE (ML) INTRAVENOUS ONCE
Refills: 0 | Status: DISCONTINUED | OUTPATIENT
Start: 2024-03-25 | End: 2024-03-27

## 2024-03-25 NOTE — ED ADULT NURSE NOTE - ED STAT RN HANDOFF DETAILS 2
Handoff report given to BROOKLYN Gallagher. All pending orders endorsed, safety precautions maintained throughout the shift. Fall and skin precautions in place. Pt updated on plan of care.

## 2024-03-25 NOTE — ED ADULT NURSE NOTE - CHIEF COMPLAINT QUOTE
Patient BIB EMS from [facility] urgent with complaints of chest pain, patient received aspirin 81mg x4 tablet and Nitro-sublingual around 4PM with relief. Stat EKG ordered., patient currently reporting chest tightness.

## 2024-03-25 NOTE — ED ADULT NURSE NOTE - ED STAT RN HANDOFF DETAILS
Report endorsed to oncoming RN rishi . Safety checks compld this shift/Safety rounds completed hourly.  IV sites checked Q2+remains WDL. Meds given as ord with no s/s of adverse RXNs. Fall +skin precs in place. Any issues endorsed to oncoming RN for follow up.

## 2024-03-25 NOTE — ED ADULT TRIAGE NOTE - CHIEF COMPLAINT QUOTE
Patient BIB EMS from [facility] urgent with complaints of chest pain, patient received aspirin 81mg x4 tablet and Nitro-sublingual around 4PM with relief. Stat EKG ordered., patient currently reporting chest tightness.

## 2024-03-25 NOTE — ED PROVIDER NOTE - PHYSICAL EXAMINATION
General: Well appearing female in no acute distress  HEENT: Normocephalic, atraumatic. Moist mucous membranes. Oropharynx clear. No lymphadenopathy.  Eyes: No scleral icterus. EOMI. DAMIAN.  Neck:. Soft and supple. Full ROM without pain. No midline tenderness  Cardiac: Regular rate and regular rhythm. No murmurs, rubs, gallops. Peripheral pulses 2+ and symmetric. No LE edema.  Resp: Lungs CTAB. Speaking in full sentences. No wheezes, rales or rhonchi.  Abd: Soft, non-tender, non-distended. No guarding or rebound. No scars, masses, or lesions.  Back: Spine midline and non-tender. No CVA tenderness.    Skin: No rashes, abrasions, or lacerations.  Neuro: AO x 3. Moves all extremities symmetrically. Motor strength and sensation grossly intact.

## 2024-03-25 NOTE — ED PROVIDER NOTE - CLINICAL SUMMARY MEDICAL DECISION MAKING FREE TEXT BOX
62 y/o F hx of HTN, DM presents w/ chest pain that began at 1 PM. middle of her chest but also felt pain over R and left. states she took 4x 81mg ASA w/ some improvement but that pain really subsided after getting sublingual nitro with EMS. has no significant active chest pain per patient currently. last stress test was in 2022 and normal per patient , caro snot remember last echocardiogram that was performed.   denies fever/chills. denies nausea/vomiting. denies abdominal pain. denies trauma/falls. denies dysuria.   EKG NSR, no stemi, no signs of acute ischemia.   cxr, check labs.   consider acs. 62 y/o F hx of HTN, DM presents w/ chest pain that began at 1 PM. middle of her chest but also felt pain over R and left. states she took 4x 81mg ASA w/ some improvement but that pain really subsided after getting sublingual nitro with EMS. has no significant active chest pain per patient currently. last stress test was in 2022 and normal per patient , caro snot remember last echocardiogram that was performed.   denies fever/chills. denies nausea/vomiting. denies abdominal pain. denies trauma/falls. denies dysuria.   EKG NSR, no stemi, no signs of acute ischemia.   cxr, check labs.   consider acs.    heart score 5 , no echo/stress wihtin last 1 year. ekg reviewed, trop x2 wnl. admit to medicine service. patient chest pain free in ER s/p nitro.

## 2024-03-25 NOTE — ED ADULT NURSE NOTE - OBJECTIVE STATEMENT
Problem: Mobility Impaired (Adult and Pediatric)  Goal: *Acute Goals and Plan of Care (Insert Text)  Physical Therapy Goals  Initiated 8/12/2017 and to be accomplished within 5-7 day(s)  1. Patient will move from supine to sit and sit to supine in bed with modified independence. 2. Patient will transfer from bed to chair and chair to bed with supervision/set-up using the least restrictive device. 3. Patient will perform sit to stand with supervision/set-up. 4. Patient will ambulate with supervision/set-up for 150 feet with the least restrictive device. 5. Patient will ascend/descend 3 stairs with 1-2 handrail(s) with minimal assistance/contact guard assist.  Outcome: Progressing Towards Goal  PHYSICAL THERAPY EVALUATION     Patient: Raquel Liao (60 y.o. female)  Date: 8/12/2017  Primary Diagnosis: Osteoarthritis of right knee, unspecified osteoarthritis type [M17.11]  Procedure(s) (LRB):  RIGHT TOTAL KNEE ARTHROPLASTY (Right) 1 Day Post-Op   Precautions:    Fall, WBAT, Skin      ASSESSMENT :  Based on the objective data described below, the patient presents with P/O R TKA and found supine in bed. She was agreeable to participate with consult and get to the bedside chair. She ambulated SBA with RW 12 feet in the room from the bed to the chair. Drain is still in. Ice reapplied and ankle roll. Nursing to reapply sequential pumps in 30 minutes. Addendum: POST EVAL DISCUSSED WITH SURGEON WHO ANTICPATES DC TODAY P/O DAY 1. PATIENT WILL NEED STAIR TRAINING PRIOR TO DC. THANK YOU. Patient will benefit from skilled intervention to address the above impairments.   Patients rehabilitation potential is considered to be Good  Factors which may influence rehabilitation potential include:   [X]         None noted  [ ]         Mental ability/status  [ ]         Medical condition  [ ]         Home/family situation and support systems  [ ]         Safety awareness  [ ]         Pain tolerance/management  [ ] Other: PLAN :  Recommendations and Planned Interventions:  [X]           Bed Mobility Training             [X]    Neuromuscular Re-Education  [X]           Transfer Training                   [ ]    Orthotic/Prosthetic Training  [X]           Gait Training                          [ ]    Modalities  [X]           Therapeutic Exercises          [X]    Edema Management/Control  [X]           Therapeutic Activities            [X]    Patient and Family Training/Education  [ ]           Other (comment):     Frequency/Duration: Patient will be followed by physical therapy 1-2 times per day/4-7 days per week to address goals. Discharge Recommendations: Home Health  Further Equipment Recommendations for Discharge:        G-CODES       Mobility  Current  CJ= 20-39%   Goal  CI= 1-19%. The severity rating is based on the Level of Assistance required for Functional Mobility and ADLs. Eval Complexity: History: MEDIUM  Complexity : 1-2 comorbidities / personal factors will impact the outcome/ POC Exam:MEDIUM Complexity : 3 Standardized tests and measures addressing body structure, function, activity limitation and / or participation in recreation  Presentation: LOW Complexity : Stable, uncomplicated  Clinical Decision Making:Other outcome measures level of assistance with functional mobility  LOW Overall Complexity:LOW       SUBJECTIVE:   Patient stated I am not quite sure what to expect, they were supposed to get me a walker with wheels for here.       OBJECTIVE DATA SUMMARY:       Past Medical History:   Diagnosis Date    Achilles tendinitis on left       with calacneal spurring    Bronchopneumonia      Chondromalacia of left patella      Depression      Flat foot       left    Hypertension      Left leg pain      Lumbar sprain       chronic    Osteoarthrosis      Osteoporosis      Popliteal cyst      Sciatica       sensory neuropathy, left lower extremity     Past Surgical History: Procedure Laterality Date    HX WISDOM TEETH EXTRACTION         Prior Level of Function/Home Situation: I   Home Situation  Home Environment: Private residence  # Steps to Enter: 6  Rails to Enter: Yes  Hand Rails : Right  One/Two Story Residence: One story  Living Alone: No  Support Systems: Family member(s), Friends \ neighbors, Spouse/Significant Other/Partner  Patient Expects to be Discharged to[de-identified] Private residence  Current DME Used/Available at Home: Blood pressure cuff, Wheelchair  Critical Behavior:  Neurologic State: Alert  Orientation Level: Oriented X4  Cognition: Follows commands  Safety/Judgement: Fall prevention  Psychosocial  Patient Behaviors: Calm; Cooperative  Purposeful Interaction: Yes  Strength:    Strength: Within functional limits (P/O R knee)  Tone & Sensation:   Tone: Normal (P/O R knee)  Sensation: Intact (P/O R knee)  Range Of Motion:  AROM: Within functional limits (P/O R knee)   Functional Mobility:  Bed Mobility:  Supine to Sit: Contact guard assistance  Sit to Supine: Contact guard assistance  Scooting: Stand-by asssistance  Transfers:  Sit to Stand: Minimum assistance  Stand to Sit: Contact guard assistance  Bed to Chair: Contact guard assistance  Balance:   Sitting: Intact; Without support  Standing: With support  Ambulation/Gait Training:  Distance (ft): 12 Feet (ft) (in room)  Assistive Device: Walker, rolling  Ambulation - Level of Assistance: Stand-by asssistance     Gait Abnormalities: Antalgic  Right Side Weight Bearing: As tolerated     Base of Support: Widened  Stance: Right decreased  Speed/Linda: Fluctuations  Step Length: Right shortened  Swing Pattern: Right asymmetrical     Therapeutic Exercises:   AP,GS,QS, SLR F  Pain:  Pt reports 5/10 pain or discomfort prior to treatment. Pt reports 5/10 pain or discomfort post treatment. Activity Tolerance:   soha well  Please refer to the flowsheet for vital signs taken during this treatment.   After treatment:   [X] Patient left in no apparent distress sitting up in chair  [ ]         Patient left in no apparent distress in bed  [X]         Call bell left within reach  [X]         Nursing notified  [ ]         Caregiver present  [ ]         Bed alarm activated      COMMUNICATION/EDUCATION:   [X]         Fall prevention education was provided and the patient/caregiver indicated understanding. [X]         Patient/family have participated as able in goal setting and plan of care. [X]         Patient/family agree to work toward stated goals and plan of care. [ ]         Patient understands intent and goals of therapy, but is neutral about his/her participation. [ ]         Patient is unable to participate in goal setting and plan of care.      Thank you for this referral.  Tyron Garcia, PT   Time Calculation: 24 mins A&OX4 Patient C/O 10/10 sudden onset generalized twisting chest pain radiating b/l neck pain with SOB now resolved .denies diaphoresis/dizziness/ Nausea at this time PMH HTN/HLD/DM2

## 2024-03-25 NOTE — ED PROVIDER NOTE - OBJECTIVE STATEMENT
62 y/o F hx of HTN, DM presents w/ chest pain that began at 1 PM. middle of her chest but also felt pain over R and left. states she took 4x 81mg ASA w/ some improvement but that pain really subsided after getting sublingual nitro with EMS. has no significant active chest pain per patient currently. last stress test was in 2022 and normal per patient , caro snot remember last echocardiogram that was performed. Denies fever/chills. denies trauma/falls. denies abdominal pain.

## 2024-03-26 ENCOUNTER — TRANSCRIPTION ENCOUNTER (OUTPATIENT)
Age: 64
End: 2024-03-26

## 2024-03-26 DIAGNOSIS — E11.9 TYPE 2 DIABETES MELLITUS WITHOUT COMPLICATIONS: ICD-10-CM

## 2024-03-26 DIAGNOSIS — I10 ESSENTIAL (PRIMARY) HYPERTENSION: ICD-10-CM

## 2024-03-26 DIAGNOSIS — R07.9 CHEST PAIN, UNSPECIFIED: ICD-10-CM

## 2024-03-26 LAB
A1C WITH ESTIMATED AVERAGE GLUCOSE RESULT: 9.3 % — HIGH (ref 4–5.6)
ALBUMIN SERPL ELPH-MCNC: 2.9 G/DL — LOW (ref 3.3–5)
ALP SERPL-CCNC: 78 U/L — SIGNIFICANT CHANGE UP (ref 40–120)
ALT FLD-CCNC: 42 U/L — SIGNIFICANT CHANGE UP (ref 12–78)
ANION GAP SERPL CALC-SCNC: 4 MMOL/L — LOW (ref 5–17)
AST SERPL-CCNC: 29 U/L — SIGNIFICANT CHANGE UP (ref 15–37)
BILIRUB SERPL-MCNC: 0.3 MG/DL — SIGNIFICANT CHANGE UP (ref 0.2–1.2)
BUN SERPL-MCNC: 11 MG/DL — SIGNIFICANT CHANGE UP (ref 7–23)
CALCIUM SERPL-MCNC: 8.6 MG/DL — SIGNIFICANT CHANGE UP (ref 8.5–10.1)
CHLORIDE SERPL-SCNC: 109 MMOL/L — HIGH (ref 96–108)
CHOLEST SERPL-MCNC: 120 MG/DL — SIGNIFICANT CHANGE UP
CO2 SERPL-SCNC: 29 MMOL/L — SIGNIFICANT CHANGE UP (ref 22–31)
CREAT SERPL-MCNC: 0.78 MG/DL — SIGNIFICANT CHANGE UP (ref 0.5–1.3)
EGFR: 84 ML/MIN/1.73M2 — SIGNIFICANT CHANGE UP
ESTIMATED AVERAGE GLUCOSE: 220 MG/DL — HIGH (ref 68–114)
GLUCOSE BLDC GLUCOMTR-MCNC: 166 MG/DL — HIGH (ref 70–99)
GLUCOSE BLDC GLUCOMTR-MCNC: 203 MG/DL — HIGH (ref 70–99)
GLUCOSE BLDC GLUCOMTR-MCNC: 279 MG/DL — HIGH (ref 70–99)
GLUCOSE BLDC GLUCOMTR-MCNC: 366 MG/DL — HIGH (ref 70–99)
GLUCOSE SERPL-MCNC: 183 MG/DL — HIGH (ref 70–99)
HCT VFR BLD CALC: 33.4 % — LOW (ref 34.5–45)
HDLC SERPL-MCNC: 52 MG/DL — SIGNIFICANT CHANGE UP
HGB BLD-MCNC: 10.8 G/DL — LOW (ref 11.5–15.5)
LIPID PNL WITH DIRECT LDL SERPL: 53 MG/DL — SIGNIFICANT CHANGE UP
MCHC RBC-ENTMCNC: 27.6 PG — SIGNIFICANT CHANGE UP (ref 27–34)
MCHC RBC-ENTMCNC: 32.3 G/DL — SIGNIFICANT CHANGE UP (ref 32–36)
MCV RBC AUTO: 85.4 FL — SIGNIFICANT CHANGE UP (ref 80–100)
NON HDL CHOLESTEROL: 68 MG/DL — SIGNIFICANT CHANGE UP
NRBC # BLD: 0 /100 WBCS — SIGNIFICANT CHANGE UP (ref 0–0)
PLATELET # BLD AUTO: 294 K/UL — SIGNIFICANT CHANGE UP (ref 150–400)
POTASSIUM SERPL-MCNC: 4.1 MMOL/L — SIGNIFICANT CHANGE UP (ref 3.5–5.3)
POTASSIUM SERPL-SCNC: 4.1 MMOL/L — SIGNIFICANT CHANGE UP (ref 3.5–5.3)
PROT SERPL-MCNC: 6.6 GM/DL — SIGNIFICANT CHANGE UP (ref 6–8.3)
RBC # BLD: 3.91 M/UL — SIGNIFICANT CHANGE UP (ref 3.8–5.2)
RBC # FLD: 14 % — SIGNIFICANT CHANGE UP (ref 10.3–14.5)
SODIUM SERPL-SCNC: 142 MMOL/L — SIGNIFICANT CHANGE UP (ref 135–145)
TRIGL SERPL-MCNC: 71 MG/DL — SIGNIFICANT CHANGE UP
TROPONIN I, HIGH SENSITIVITY RESULT: 5.4 NG/L — SIGNIFICANT CHANGE UP
WBC # BLD: 5.26 K/UL — SIGNIFICANT CHANGE UP (ref 3.8–10.5)
WBC # FLD AUTO: 5.26 K/UL — SIGNIFICANT CHANGE UP (ref 3.8–10.5)

## 2024-03-26 PROCEDURE — 93306 TTE W/DOPPLER COMPLETE: CPT | Mod: 26

## 2024-03-26 PROCEDURE — 99222 1ST HOSP IP/OBS MODERATE 55: CPT

## 2024-03-26 PROCEDURE — 99233 SBSQ HOSP IP/OBS HIGH 50: CPT

## 2024-03-26 RX ORDER — LOSARTAN POTASSIUM 100 MG/1
25 TABLET, FILM COATED ORAL DAILY
Refills: 0 | Status: DISCONTINUED | OUTPATIENT
Start: 2024-03-26 | End: 2024-03-27

## 2024-03-26 RX ORDER — INFLUENZA VIRUS VACCINE 15; 15; 15; 15 UG/.5ML; UG/.5ML; UG/.5ML; UG/.5ML
0.7 SUSPENSION INTRAMUSCULAR ONCE
Refills: 0 | Status: DISCONTINUED | OUTPATIENT
Start: 2024-03-26 | End: 2024-03-27

## 2024-03-26 RX ORDER — ASPIRIN/CALCIUM CARB/MAGNESIUM 324 MG
1 TABLET ORAL
Qty: 0 | Refills: 0 | DISCHARGE
Start: 2024-03-26

## 2024-03-26 RX ORDER — ATORVASTATIN CALCIUM 80 MG/1
20 TABLET, FILM COATED ORAL AT BEDTIME
Refills: 0 | Status: DISCONTINUED | OUTPATIENT
Start: 2024-03-26 | End: 2024-03-27

## 2024-03-26 RX ADMIN — LOSARTAN POTASSIUM 25 MILLIGRAM(S): 100 TABLET, FILM COATED ORAL at 06:04

## 2024-03-26 RX ADMIN — Medication 4: at 12:30

## 2024-03-26 RX ADMIN — ATORVASTATIN CALCIUM 20 MILLIGRAM(S): 80 TABLET, FILM COATED ORAL at 22:12

## 2024-03-26 RX ADMIN — Medication 81 MILLIGRAM(S): at 12:30

## 2024-03-26 RX ADMIN — Medication 10: at 17:29

## 2024-03-26 RX ADMIN — Medication 6: at 08:34

## 2024-03-26 NOTE — CONSULT NOTE ADULT - SUBJECTIVE AND OBJECTIVE BOX
63F HTN, DM, (-) stress test in 2022 (per pt), p/w CP after eating that lasted 1 hr and was relieved 15 mins after getting SL NTG. Normally no exertional CP. Now CP free.  Trop(-).  ECG: WNL.    Medications:  acetaminophen     Tablet .. 650 milliGRAM(s) Oral every 6 hours PRN  aluminum hydroxide/magnesium hydroxide/simethicone Suspension 30 milliLiter(s) Oral every 4 hours PRN  aspirin  chewable 81 milliGRAM(s) Oral daily  atorvastatin 20 milliGRAM(s) Oral at bedtime  dextrose 5%. 1000 milliLiter(s) IV Continuous <Continuous>  dextrose 50% Injectable 25 Gram(s) IV Push once  dextrose Oral Gel 15 Gram(s) Oral once PRN  glucagon  Injectable 1 milliGRAM(s) IntraMuscular once  influenza  Vaccine (HIGH DOSE) 0.7 milliLiter(s) IntraMuscular once  insulin lispro (ADMELOG) corrective regimen sliding scale   SubCutaneous three times a day before meals  losartan 25 milliGRAM(s) Oral daily  melatonin 3 milliGRAM(s) Oral at bedtime PRN  ondansetron Injectable 4 milliGRAM(s) IV Push every 8 hours PRN      Vitals:  Vital Signs Last 24 Hrs  T(C): 36.5 (26 Mar 2024 07:50), Max: 36.8 (25 Mar 2024 23:29)  T(F): 97.7 (26 Mar 2024 07:50), Max: 98.2 (25 Mar 2024 23:29)  HR: 74 (26 Mar 2024 07:50) (66 - 87)  BP: 143/69 (26 Mar 2024 07:50) (124/53 - 158/74)  BP(mean): --  RR: 18 (26 Mar 2024 07:50) (15 - 20)  SpO2: 100% (26 Mar 2024 07:50) (98% - 100%)    Parameters below as of 26 Mar 2024 05:24  Patient On (Oxygen Delivery Method): room air    Daily Height in cm: 162.56 (25 Mar 2024 16:30)      Physical Exam:     General: No distress. Comfortable.  Neck: Neck supple. JVP not elevated. No carotid bruits.  Chest: Clear to auscultation bilaterally  CV: Normal S1 and S2. No murmurs, rub, or gallops.  Abdomen: Soft, non-distended, non-tender  Psych: Affect normal    Labs:                        10.8   5.26  )-----------( 294      ( 26 Mar 2024 07:30 )             33.4     03-26    142  |  109<H>  |  11  ----------------------------<  183<H>  4.1   |  29  |  0.78    Ca    8.6      26 Mar 2024 07:30    TPro  6.6  /  Alb  2.9<L>  /  TBili  0.3  /  DBili  x   /  AST  29  /  ALT  42  /  AlkPhos  78  03-26    PT/INR - ( 25 Mar 2024 17:00 )   PT: 10.8 sec;   INR: 0.90 ratio         PTT - ( 25 Mar 2024 17:00 )  PTT:28.2 sec

## 2024-03-26 NOTE — H&P ADULT - NSHPLABSRESULTS_GEN_ALL_CORE
11.5   7.45  )-----------( 325      ( 25 Mar 2024 17:00 )             33.9     141  |  108  |  12  ----------------------------<  162<H>     03-25  3.8   |  29  |  0.79    Ca    9.4      25 Mar 2024 17:00    TPro  7.4  /  Alb  3.4  /  TBili  0.3  /  DBili  x   /  AST  41<H>  /  ALT  49  /  AlkPhos  88  03-25    PT/INR: 10.8/0.90 (03-25-24 @ 17:00)  PTT: 28.2 (03-25-24 @ 17:00)

## 2024-03-26 NOTE — CONSULT NOTE ADULT - ASSESSMENT
63F HTN, DM, p/w likely non-cardiac CP.    -Echo.  -No pressing need for stress test. Can get it after seeing a cardiologist as an outpt.  -Increase losartan to 50 mg po qd.    Please call with results and questions.

## 2024-03-26 NOTE — CHART NOTE - NSCHARTNOTESELECT_GEN_ALL_CORE
Event Note Patient states that she was at a party last night. She states that she was drinking [employer]. \" She states that at one point she took a \"xanax. \"  She states that the last time she remembers drinking or taking the pill was about 0600 or 0700. She was brought in via medics. Per medics a call was made for a unconscious woman who was breathing. The patient received 4mg nasal Narcan, awoke and was brought here.        Cathie Moreno RN  01/09/20 [id number]

## 2024-03-26 NOTE — H&P ADULT - PROBLEM SELECTOR PLAN 1
Sudden onset of substernal chest pain, Alleviated by Nitro   ECG- NSR, Troponin (-)   Heart Score = 5   Family history of MI & sudden cardiac death   - Tele   - Aspirin   - Statin   - Repeat Troponin   - ECG   - Echo   - Cardio consult   - DVT prophylaxis

## 2024-03-26 NOTE — DISCHARGE NOTE PROVIDER - NSDCCPCAREPLAN_GEN_ALL_CORE_FT
PRINCIPAL DISCHARGE DIAGNOSIS  Diagnosis: Chest pain  Assessment and Plan of Treatment: follow up with cardiologist   return to the ED wsith any chest pain or shortness of breath     PRINCIPAL DISCHARGE DIAGNOSIS  Diagnosis: Chest pain  Assessment and Plan of Treatment: follow up with cardiologist   return to the ED wsith any chest pain or shortness of breath      SECONDARY DISCHARGE DIAGNOSES  Diagnosis: Diabetes mellitus  Assessment and Plan of Treatment:     Diagnosis: Hypertension  Assessment and Plan of Treatment:

## 2024-03-26 NOTE — DISCHARGE NOTE PROVIDER - NSDCFUADDAPPT_GEN_ALL_CORE_FT
APPTS ARE READY TO BE MADE: [X] YES    Best Family or Patient Contact (if needed):    Additional Information about above appointments (if needed):    1:   2:   3:     Other comments or requests:    APPTS ARE READY TO BE MADE: [X] YES    Best Family or Patient Contact (if needed):    Additional Information about above appointments (if needed):    1:   2:   3:     Other comments or requests:   During your hospitalization, you had abnormal findings on radiologic / laboratory studies.  Please see your primary doctor for followup of these findings to ensure that they have resolved.  You may require further evaluation to exclude certain serious conditions, and / or treatment.     < from: NM Pharm Stress Test, Dual Isotope (03.27.24 @ 14:14) >      1. There is scintigraphic evidence for myocardial infarct and   mg-infarct ischemia in the distal LAD territory on SPECT imaging. These   findings may be overstated due to limited study counts and gut   attenuation..    2. There is normal left ventricular contractility, normal calculated   ejection fraction and wall motion abnormality, as described above.   Overall post stress ejection fraction was 64%    3. Please see the cardiac test report for EKG findings and symptoms   during the procedure      < end of copied text >     APPTS ARE READY TO BE MADE: [X] YES    Best Family or Patient Contact (if needed):    Additional Information about above appointments (if needed):    1:   2:   3:     Other comments or requests:   During your hospitalization, you had abnormal findings on radiologic / laboratory studies.  Please see your primary doctor for followup of these findings to ensure that they have resolved.  You may require further evaluation to exclude certain serious conditions, and / or treatment.     < from: NM Pharm Stress Test, Dual Isotope (03.27.24 @ 14:14) >      1. There is scintigraphic evidence for myocardial infarct and   mg-infarct ischemia in the distal LAD territory on SPECT imaging. These   findings may be overstated due to limited study counts and gut   attenuation..    2. There is normal left ventricular contractility, normal calculated   ejection fraction and wall motion abnormality, as described above.   Overall post stress ejection fraction was 64%    3. Please see the cardiac test report for EKG findings and symptoms   during the procedure      < end of copied text >    Patient advised they did not want to proceed with scheduling appointments with the providers on their referrals. They will coordinate care on their own.

## 2024-03-26 NOTE — H&P ADULT - HISTORY OF PRESENT ILLNESS
63 year old male with a PMH of HTN, DM presents to the ED for chest pain. Endorses substernal chest pain started this afternoon, describes as pressure like radiates to both sides of the chest and neck, minimal relief with aspirin and ibuprofen which prompted EMS called. On the field received sublingual Nitro by EMS which alleviated the pain & has not returned since then. Denies previous episode. Last seen a cardiologist in 2022 & had a stress test then (normal). Family history of MI & sudden cardiac death. Labs unremarkable. Vitals stable. Heart score:5. Endorses, she is currently feeling well, denies any acute pain or discomfort.

## 2024-03-26 NOTE — PATIENT PROFILE ADULT - NSPRESCRUSEDDRG_GEN_A_NUR
Call complete for pre procedure reminder, travel screen and updated visitor policy.  Per heart transplant criteria, Covid test not done.     No

## 2024-03-26 NOTE — CHART NOTE - NSCHARTNOTEFT_GEN_A_CORE
Patient was outreached but did not answer. A voicemail was left for the patient to return our call [phone]

## 2024-03-26 NOTE — DISCHARGE NOTE PROVIDER - NSDCMRMEDTOKEN_GEN_ALL_CORE_FT
aspirin 81 mg oral tablet, chewable: 1 tab(s) orally once a day  atorvastatin 20 mg oral tablet: 1 tab(s) orally once a day (at bedtime)  losartan 25 mg oral tablet: 1 tab(s) orally once a day  metFORMIN 500 mg oral tablet: 1 tab(s) orally

## 2024-03-26 NOTE — DISCHARGE NOTE PROVIDER - CARE PROVIDER_API CALL
Nilesh Lainez  Cardiovascular Disease  [address] [place] [address]  Phone: [phone]  Fax: [phone]  Follow Up Time:

## 2024-03-26 NOTE — DISCHARGE NOTE PROVIDER - HOSPITAL COURSE
63 year old male with a PMH of HTN, DM presents to the ED for chest pain. Endorses substernal chest pain started this afternoon, describes as pressure like radiates to both sides of the chest and neck, minimal relief with aspirin and ibuprofen which prompted EMS called. On the field received sublingual Nitro by EMS which alleviated the pain & has not returned since then. Denies previous episode. Currently hemodynamically stable. Denies any further episodes of pain or sob. Has reproducible pain on exam. Troponin grossly negative x3. ECG NSR. Pt was offered tte and stress due to risk factors but pt electing for outpatient follow up and cardiology agreed with plan.       Problem/Plan - 1:  ·  Problem: Chest pain.        Problem/Plan - 2:  ·  Problem: Hypertension.   ·  Plan: Normotensive   Continue home meds   - Losartan 25mg  - Monitor BP.     Problem/Plan - 3:  ·  Problem: Diabetes mellitus.      63 year old male with a PMH of HTN, DM presents to the ED for substernal chest pain.     < from: NM Pharm Stress Test, Dual Isotope (03.27.24 @ 14:14) > There is scintigraphic evidence for myocardial infarct and mg-infarct ischemia in the distal LAD territory on SPECT imaging. These findings may be overstated due to limited study counts and gut attenuati< end of copied text >      # Chest pain, Abnormal Stress Test  - Sudden onset of substernal chest pain, Alleviated by NTG.  Seen by cardiology.  NST as noted above.  Discussed with Cardiology, pt asymptomatic during NST.  Stable for outpatient cardiology f/u.  Advised to return if she experiences any recurrence or worsening of symptoms.  COntinue ASA, Statin.  # Essential HTN - Monitor BP.  Continue antihypertensives.  # Diabetes Type II - monitor fingersticks.  Insulin coverage for hyperglycemia.  # DVT Prophylaxis - OOB     Disposition: Stable for discharge.  Outpatient followup discussed.  Total time spent on discharge is  50 minutes.

## 2024-03-26 NOTE — PROGRESS NOTE ADULT - PROBLEM SELECTOR PLAN 1
Sudden onset of substernal chest pain, Alleviated by Nitro   ECG- NSR, Troponin (-) x3   Heart Score = 5   Family history of MI & sudden cardiac death   - seen by cardiology and was offered inpatient vs outpatient stress and pt has now changed her mind and wants to stay to do it inpatient. Doubt acs  - npo after midnight for nuclear stress   - Tele   - Aspirin   - Statin   - Repeat Troponin   - ECG   - Echo   - Cardio consult   - DVT prophylaxis

## 2024-03-26 NOTE — PATIENT PROFILE ADULT - FALL HARM RISK - HARM RISK INTERVENTIONS

## 2024-03-26 NOTE — H&P ADULT - ASSESSMENT
63 year old male with a PMH of HTN, DM presents to the ED for chest pain. Endorses substernal chest pain started this afternoon, describes as pressure like radiates to both sides of the chest and neck, minimal relief with aspirin and ibuprofen which prompted EMS called. On the field received sublingual Nitro by EMS which alleviated the pain & has not returned since then. Denies previous episode. Currently hemodynamically stable

## 2024-03-26 NOTE — DISCHARGE NOTE PROVIDER - ATTENDING DISCHARGE PHYSICAL EXAMINATION:
GENERAL: NAD, well-groomed, well-developed  HEAD:  Atraumatic, Normocephalic  EYES: EOMI, PERRLA, conjunctiva and sclera clear  ENMT: No tonsillar erythema, exudates, or enlargement; Moist mucous membranes, No lesions  NECK: Supple, No JVD, Normal thyroid  NERVOUS SYSTEM:  Alert & Oriented X3, Good concentration DTRs 2+ intact and symmetric  CHEST/LUNG: Clear to percussion bilaterally; No rales, rhonchi, wheezing, or rubs  HEART: Regular rate and rhythm; No murmurs, rubs, or gallops. mild sternal tenderness   ABDOMEN: Soft, Nontender, Nondistended; Bowel sounds present  EXTREMITIES: No edema   LYMPH: No lymphadenopathy noted

## 2024-03-27 ENCOUNTER — TRANSCRIPTION ENCOUNTER (OUTPATIENT)
Age: 64
End: 2024-03-27

## 2024-03-27 VITALS
SYSTOLIC BLOOD PRESSURE: 147 MMHG | DIASTOLIC BLOOD PRESSURE: 84 MMHG | RESPIRATION RATE: 18 BRPM | OXYGEN SATURATION: 100 % | TEMPERATURE: 98 F | HEART RATE: 90 BPM

## 2024-03-27 LAB
ANION GAP SERPL CALC-SCNC: 6 MMOL/L — SIGNIFICANT CHANGE UP (ref 5–17)
BUN SERPL-MCNC: 12 MG/DL — SIGNIFICANT CHANGE UP (ref 7–23)
CALCIUM SERPL-MCNC: 8.6 MG/DL — SIGNIFICANT CHANGE UP (ref 8.5–10.1)
CHLORIDE SERPL-SCNC: 107 MMOL/L — SIGNIFICANT CHANGE UP (ref 96–108)
CO2 SERPL-SCNC: 27 MMOL/L — SIGNIFICANT CHANGE UP (ref 22–31)
CREAT SERPL-MCNC: 0.8 MG/DL — SIGNIFICANT CHANGE UP (ref 0.5–1.3)
EGFR: 82 ML/MIN/1.73M2 — SIGNIFICANT CHANGE UP
GLUCOSE BLDC GLUCOMTR-MCNC: 204 MG/DL — HIGH (ref 70–99)
GLUCOSE BLDC GLUCOMTR-MCNC: 341 MG/DL — HIGH (ref 70–99)
GLUCOSE SERPL-MCNC: 200 MG/DL — HIGH (ref 70–99)
HCV AB S/CO SERPL IA: 0.13 S/CO — SIGNIFICANT CHANGE UP (ref 0–0.99)
HCV AB SERPL-IMP: SIGNIFICANT CHANGE UP
MAGNESIUM SERPL-MCNC: 2.1 MG/DL — SIGNIFICANT CHANGE UP (ref 1.6–2.6)
POTASSIUM SERPL-MCNC: 3.8 MMOL/L — SIGNIFICANT CHANGE UP (ref 3.5–5.3)
POTASSIUM SERPL-SCNC: 3.8 MMOL/L — SIGNIFICANT CHANGE UP (ref 3.5–5.3)
SODIUM SERPL-SCNC: 140 MMOL/L — SIGNIFICANT CHANGE UP (ref 135–145)

## 2024-03-27 PROCEDURE — 78452 HT MUSCLE IMAGE SPECT MULT: CPT | Mod: 26

## 2024-03-27 PROCEDURE — 99239 HOSP IP/OBS DSCHRG MGMT >30: CPT

## 2024-03-27 RX ORDER — REGADENOSON 0.08 MG/ML
0.4 INJECTION, SOLUTION INTRAVENOUS ONCE
Refills: 0 | Status: COMPLETED | OUTPATIENT
Start: 2024-03-27 | End: 2024-03-27

## 2024-03-27 RX ADMIN — REGADENOSON 0.4 MILLIGRAM(S): 0.08 INJECTION, SOLUTION INTRAVENOUS at 11:49

## 2024-03-27 RX ADMIN — Medication 81 MILLIGRAM(S): at 15:23

## 2024-03-27 RX ADMIN — Medication 8: at 18:16

## 2024-03-27 NOTE — DISCHARGE NOTE NURSING/CASE MANAGEMENT/SOCIAL WORK - NSTRANSFERDENTURES_GEN_A_NUR
Group Note    Date: 10/02/23  Start Time: 8:00 AM   End Time:8:30 AM     Number of Participants: 14    Type of Group: Community/Goal     Patient's Goal:  work on coping skills    Notes:      Status After Intervention:      Participation Level:  Active Listener    Participation Quality: Appropriate    Speech:  normal    Thought Process/Content: Logical    Mood:  calm    Level of consciousness:  Alert    Response to Learning: Able to verbalize current knowledge/experience    Modes of Intervention: Education and Support    Discipline Responsible: Behavioral Health Technician     Signature:  Randa Gonzalez full/upper

## 2024-03-27 NOTE — PROGRESS NOTE ADULT - SUBJECTIVE AND OBJECTIVE BOX
Patient: CHARLOTTE SUTTON [id number] 63y Female                            Hospitalist Attending Note    No complaints.  No chest pain / palpitations.   No SOB      ____________________PHYSICAL EXAM:  GENERAL:  NAD Alert and Oriented x 3   HEENT: NCAT  CARDIOVASCULAR:  S1, S2  LUNGS: CTAB  ABDOMEN:  soft, (-) tenderness, (-) distension, (+) bowel sounds, (-) guarding, (-) rebound (-) rigidity  EXTREMITIES:  no cyanosis / clubbing / edema.   ____________________       VITALS:  Vital Signs Last 24 Hrs  T(C): 36.7 (27 Mar 2024 09:42), Max: 36.7 (27 Mar 2024 09:42)  T(F): 98 (27 Mar 2024 09:42), Max: 98 (27 Mar 2024 09:42)  HR: 74 (27 Mar 2024 09:42) (74 - 96)  BP: 136/79 (27 Mar 2024 09:42) (136/79 - 161/70)  BP(mean): --  RR: 18 (27 Mar 2024 09:42) (18 - 18)  SpO2: 97% (27 Mar 2024 09:42) (96% - 100%)    Parameters below as of 27 Mar 2024 09:42  Patient On (Oxygen Delivery Method): room air     Daily     Daily   CAPILLARY BLOOD GLUCOSE      POCT Blood Glucose.: 341 mg/dL (27 Mar 2024 17:58)  POCT Blood Glucose.: 204 mg/dL (27 Mar 2024 07:49)  POCT Blood Glucose.: 166 mg/dL (26 Mar 2024 22:14)    I&O's Summary      HISTORY:  PAST MEDICAL & SURGICAL HISTORY:  HTN (hypertension)      DM (diabetes mellitus)      Chronic back pain      Allergies    No Known Allergies    Intolerances       LABS:                        10.8   5.26  )-----------( 294      ( 26 Mar 2024 07:30 )             33.4     03-27    140  |  107  |  12  ----------------------------<  200<H>  3.8   |  27  |  0.80    Ca    8.6      27 Mar 2024 05:55  Mg     2.1     03-27    TPro  6.6  /  Alb  2.9<L>  /  TBili  0.3  /  DBili  x   /  AST  29  /  ALT  42  /  AlkPhos  78  03-26      LIVER FUNCTIONS - ( 26 Mar 2024 07:30 )  Alb: 2.9 g/dL / Pro: 6.6 gm/dL / ALK PHOS: 78 U/L / ALT: 42 U/L / AST: 29 U/L / GGT: x           Urinalysis Basic - ( 27 Mar 2024 05:55 )    Color: x / Appearance: x / SG: x / pH: x  Gluc: 200 mg/dL / Ketone: x  / Bili: x / Urobili: x   Blood: x / Protein: x / Nitrite: x   Leuk Esterase: x / RBC: x / WBC x   Sq Epi: x / Non Sq Epi: x / Bacteria: x              MEDICATIONS:  MEDICATIONS  (STANDING):  aspirin  chewable 81 milliGRAM(s) Oral daily  atorvastatin 20 milliGRAM(s) Oral at bedtime  dextrose 5%. 1000 milliLiter(s) (50 mL/Hr) IV Continuous <Continuous>  dextrose 50% Injectable 25 Gram(s) IV Push once  glucagon  Injectable 1 milliGRAM(s) IntraMuscular once  influenza  Vaccine (HIGH DOSE) 0.7 milliLiter(s) IntraMuscular once  insulin lispro (ADMELOG) corrective regimen sliding scale   SubCutaneous three times a day before meals  losartan 25 milliGRAM(s) Oral daily    MEDICATIONS  (PRN):  acetaminophen     Tablet .. 650 milliGRAM(s) Oral every 6 hours PRN Temp greater or equal to 38C (100.4F), Mild Pain (1 - 3)  aluminum hydroxide/magnesium hydroxide/simethicone Suspension 30 milliLiter(s) Oral every 4 hours PRN Dyspepsia  dextrose Oral Gel 15 Gram(s) Oral once PRN Blood Glucose LESS THAN 70 milliGRAM(s)/deciliter  melatonin 3 milliGRAM(s) Oral at bedtime PRN Insomnia  ondansetron Injectable 4 milliGRAM(s) IV Push every 8 hours PRN Nausea and/or Vomiting  
Patient is a 63y old  Female who presents with a chief complaint of Chest pain (26 Mar 2024 14:02)      INTERVAL HPI/OVERNIGHT EVENTS: denies chest pain     MEDICATIONS  (STANDING):  aspirin  chewable 81 milliGRAM(s) Oral daily  atorvastatin 20 milliGRAM(s) Oral at bedtime  dextrose 5%. 1000 milliLiter(s) (50 mL/Hr) IV Continuous <Continuous>  dextrose 50% Injectable 25 Gram(s) IV Push once  glucagon  Injectable 1 milliGRAM(s) IntraMuscular once  influenza  Vaccine (HIGH DOSE) 0.7 milliLiter(s) IntraMuscular once  insulin lispro (ADMELOG) corrective regimen sliding scale   SubCutaneous three times a day before meals  losartan 25 milliGRAM(s) Oral daily    MEDICATIONS  (PRN):  acetaminophen     Tablet .. 650 milliGRAM(s) Oral every 6 hours PRN Temp greater or equal to 38C (100.4F), Mild Pain (1 - 3)  aluminum hydroxide/magnesium hydroxide/simethicone Suspension 30 milliLiter(s) Oral every 4 hours PRN Dyspepsia  dextrose Oral Gel 15 Gram(s) Oral once PRN Blood Glucose LESS THAN 70 milliGRAM(s)/deciliter  melatonin 3 milliGRAM(s) Oral at bedtime PRN Insomnia  ondansetron Injectable 4 milliGRAM(s) IV Push every 8 hours PRN Nausea and/or Vomiting      Allergies    No Known Allergies    Intolerances        REVIEW OF SYSTEMS:  CONSTITUTIONAL: No fever, weight loss  EYES: No eye pain, visual disturbances, or discharge  ENMT:  No difficulty hearing, tinnitus, vertigo; No sinus or throat pain  RESPIRATORY: No cough, wheezing, chills or hemoptysis; No shortness of breath  CARDIOVASCULAR: No chest pain, palpitations, dizziness, or leg swelling  GASTROINTESTINAL: No abdominal or epigastric pain. No nausea, vomiting, or hematemesis; No diarrhea or constipation. No melena or hematochezia.  GENITOURINARY: No dysuria, frequency, hematuria, or incontinence  NEUROLOGICAL: No headaches, memory loss, loss of strength, numbness, or tremors  SKIN: No itching, burning, rashes, or lesions   MUSCULOSKELETAL: No joint pain or swelling; No muscle, back, or extremity pain  PSYCHIATRIC: No depression, anxiety, mood swings, or difficulty sleeping  HEME/LYMPH: No easy bruising, or bleeding gums      Vital Signs Last 24 Hrs  T(C): 36.5 (26 Mar 2024 07:50), Max: 36.8 (25 Mar 2024 23:29)  T(F): 97.7 (26 Mar 2024 07:50), Max: 98.2 (25 Mar 2024 23:29)  HR: 74 (26 Mar 2024 07:50) (66 - 87)  BP: 143/69 (26 Mar 2024 07:50) (124/53 - 158/74)  BP(mean): --  RR: 18 (26 Mar 2024 07:50) (15 - 20)  SpO2: 100% (26 Mar 2024 07:50) (98% - 100%)    Parameters below as of 26 Mar 2024 05:24  Patient On (Oxygen Delivery Method): room air        PHYSICAL EXAM:  GENERAL: NAD  HEAD:  Atraumatic, Normocephalic  EYES: EOMI, PERRLA, conjunctiva and sclera clear  ENMT: No tonsillar erythema, exudates, or enlargement;   NECK: Supple, Normal thyroid  NERVOUS SYSTEM:  Alert & Oriented X3, Good concentration; Motor Strength 5/5 B/L upper and lower extremities; DTRs 2+ intact and symmetric  CHEST/LUNG: CTABL; No rales, rhonchi, wheezing, or rubs  HEART: Regular rate and rhythm; No murmurs, rubs, or gallops  ABDOMEN: Soft, Nontender, Nondistended; Bowel sounds present  EXTREMITIES:  2+ Peripheral Pulses, No clubbing, cyanosis, or edema  LYMPH: No lymphadenopathy noted  SKIN: No rashes or lesions    LABS:                        10.8   5.26  )-----------( 294      ( 26 Mar 2024 07:30 )             33.4     03-26    142  |  109<H>  |  11  ----------------------------<  183<H>  4.1   |  29  |  0.78    Ca    8.6      26 Mar 2024 07:30    TPro  6.6  /  Alb  2.9<L>  /  TBili  0.3  /  DBili  x   /  AST  29  /  ALT  42  /  AlkPhos  78  03-26    PT/INR - ( 25 Mar 2024 17:00 )   PT: 10.8 sec;   INR: 0.90 ratio         PTT - ( 25 Mar 2024 17:00 )  PTT:28.2 sec  Urinalysis Basic - ( 26 Mar 2024 07:30 )    Color: x / Appearance: x / SG: x / pH: x  Gluc: 183 mg/dL / Ketone: x  / Bili: x / Urobili: x   Blood: x / Protein: x / Nitrite: x   Leuk Esterase: x / RBC: x / WBC x   Sq Epi: x / Non Sq Epi: x / Bacteria: x      CAPILLARY BLOOD GLUCOSE      POCT Blood Glucose.: 203 mg/dL (26 Mar 2024 12:07)  POCT Blood Glucose.: 279 mg/dL (26 Mar 2024 08:32)  POCT Blood Glucose.: 243 mg/dL (25 Mar 2024 23:25)  POCT Blood Glucose.: 150 mg/dL (25 Mar 2024 16:45)      RADIOLOGY & ADDITIONAL TESTS:    Imaging Personally Reviewed:  [ ] YES  [ ] NO    Consultant(s) Notes Reviewed:  [ ] YES  [ ] NO    Care Discussed with Consultants/Other Providers [ ] YES  [ ] NO

## 2024-03-27 NOTE — DISCHARGE NOTE NURSING/CASE MANAGEMENT/SOCIAL WORK - PATIENT PORTAL LINK FT
You can access the FollowMyHealth Patient Portal offered by [facility] by registering at the following website: [email or web address]. By joining [email or web address]’s FollowMyHealth portal, you will also be able to view your health information using other applications (apps) compatible with our system.

## 2024-03-27 NOTE — PROGRESS NOTE ADULT - ASSESSMENT
63 year old male with a PMH of HTN, DM presents to the ED for substernal chest pain.     < from: NM Pharm Stress Test, Dual Isotope (03.27.24 @ 14:14) > There is scintigraphic evidence for myocardial infarct and mg-infarct ischemia in the distal LAD territory on SPECT imaging. These findings may be overstated due to limited study counts and gut attenuati< end of copied text >      # Chest pain, Abnormal Stress Test  - Sudden onset of substernal chest pain, Alleviated by NTG.  Seen by cardiology.  NST as noted above.  Discussed with Cardiology, likely outpatient f/u.  COntinue ASA, Statin.  # Essential HTN - Monitor BP.  Continue antihypertensives.  # Diabetes Type II - monitor fingersticks.  Insulin coverage for hyperglycemia.  # DVT Prophylaxis - OOB    63 year old male with a PMH of HTN, DM presents to the ED for substernal chest pain.     < from: NM Pharm Stress Test, Dual Isotope (03.27.24 @ 14:14) > There is scintigraphic evidence for myocardial infarct and mg-infarct ischemia in the distal LAD territory on SPECT imaging. These findings may be overstated due to limited study counts and gut attenuati< end of copied text >      # Chest pain, Abnormal Stress Test  - Sudden onset of substernal chest pain, Alleviated by NTG.  Seen by cardiology.  NST as noted above.  Discussed with Cardiology, pt asymptomatic during NST.  Stable for outpatient cardiology f/u.  Advised to return if she experiences any recurrence or worsening of symptoms.  COntinue ASA, Statin.  # Essential HTN - Monitor BP.  Continue antihypertensives.  # Diabetes Type II - monitor fingersticks.  Insulin coverage for hyperglycemia.  # DVT Prophylaxis - OOB

## 2024-04-02 DIAGNOSIS — R94.39 ABNORMAL RESULT OF OTHER CARDIOVASCULAR FUNCTION STUDY: ICD-10-CM

## 2024-04-02 DIAGNOSIS — Z82.49 FAMILY HISTORY OF ISCHEMIC HEART DISEASE AND OTHER DISEASES OF THE CIRCULATORY SYSTEM: ICD-10-CM

## 2024-04-02 DIAGNOSIS — R07.9 CHEST PAIN, UNSPECIFIED: ICD-10-CM

## 2024-04-02 DIAGNOSIS — E11.65 TYPE 2 DIABETES MELLITUS WITH HYPERGLYCEMIA: ICD-10-CM

## 2024-04-02 DIAGNOSIS — Z82.41 FAMILY HISTORY OF SUDDEN CARDIAC DEATH: ICD-10-CM

## 2024-04-02 DIAGNOSIS — I10 ESSENTIAL (PRIMARY) HYPERTENSION: ICD-10-CM

## 2024-04-02 DIAGNOSIS — Z79.84 LONG TERM (CURRENT) USE OF ORAL HYPOGLYCEMIC DRUGS: ICD-10-CM

## 2024-04-02 DIAGNOSIS — R07.2 PRECORDIAL PAIN: ICD-10-CM

## 2024-05-03 NOTE — ED ADULT NURSE NOTE - SUICIDE SCREENING QUESTION 2
Replace metoprolol succinate with carvedilol 25 mg twice daily.    We discussed keeping a log of home blood pressures and notifying the office if it is consistently running above 130/80 mmHg. I have asked him/her to send me his/her readings via [facility] in 1-2 weeks.    
No

## 2024-05-15 ENCOUNTER — INPATIENT (INPATIENT)
Facility: HOSPITAL | Age: 64
LOS: 0 days | Discharge: HOME | End: 2024-05-16
Attending: INTERNAL MEDICINE | Admitting: INTERNAL MEDICINE
Payer: MEDICARE

## 2024-05-15 VITALS
OXYGEN SATURATION: 100 % | HEART RATE: 80 BPM | RESPIRATION RATE: 18 BRPM | SYSTOLIC BLOOD PRESSURE: 149 MMHG | TEMPERATURE: 98 F | DIASTOLIC BLOOD PRESSURE: 75 MMHG

## 2024-05-15 DIAGNOSIS — R94.39 ABNORMAL RESULT OF OTHER CARDIOVASCULAR FUNCTION STUDY: ICD-10-CM

## 2024-05-15 DIAGNOSIS — Z98.891 HISTORY OF UTERINE SCAR FROM PREVIOUS SURGERY: Chronic | ICD-10-CM

## 2024-05-15 DIAGNOSIS — Z90.710 ACQUIRED ABSENCE OF BOTH CERVIX AND UTERUS: Chronic | ICD-10-CM

## 2024-05-15 LAB
ANION GAP SERPL CALC-SCNC: 13 MMOL/L — SIGNIFICANT CHANGE UP (ref 7–14)
BUN SERPL-MCNC: 12 MG/DL — SIGNIFICANT CHANGE UP (ref 7–23)
CALCIUM SERPL-MCNC: 8.8 MG/DL — SIGNIFICANT CHANGE UP (ref 8.4–10.5)
CHLORIDE SERPL-SCNC: 101 MMOL/L — SIGNIFICANT CHANGE UP (ref 98–107)
CO2 SERPL-SCNC: 23 MMOL/L — SIGNIFICANT CHANGE UP (ref 22–31)
CREAT SERPL-MCNC: 0.61 MG/DL — SIGNIFICANT CHANGE UP (ref 0.5–1.3)
EGFR: 99 ML/MIN/1.73M2 — SIGNIFICANT CHANGE UP
GLUCOSE BLDC GLUCOMTR-MCNC: 192 MG/DL — HIGH (ref 70–99)
GLUCOSE BLDC GLUCOMTR-MCNC: 233 MG/DL — HIGH (ref 70–99)
GLUCOSE BLDC GLUCOMTR-MCNC: 244 MG/DL — HIGH (ref 70–99)
GLUCOSE SERPL-MCNC: 231 MG/DL — HIGH (ref 70–99)
HCT VFR BLD CALC: 34.5 % — SIGNIFICANT CHANGE UP (ref 34.5–45)
HGB BLD-MCNC: 11.6 G/DL — SIGNIFICANT CHANGE UP (ref 11.5–15.5)
MCHC RBC-ENTMCNC: 27.8 PG — SIGNIFICANT CHANGE UP (ref 27–34)
MCHC RBC-ENTMCNC: 33.6 GM/DL — SIGNIFICANT CHANGE UP (ref 32–36)
MCV RBC AUTO: 82.5 FL — SIGNIFICANT CHANGE UP (ref 80–100)
NRBC # BLD: 0 /100 WBCS — SIGNIFICANT CHANGE UP (ref 0–0)
NRBC # FLD: 0 K/UL — SIGNIFICANT CHANGE UP (ref 0–0)
PLATELET # BLD AUTO: 319 K/UL — SIGNIFICANT CHANGE UP (ref 150–400)
POTASSIUM SERPL-MCNC: 3.9 MMOL/L — SIGNIFICANT CHANGE UP (ref 3.5–5.3)
POTASSIUM SERPL-SCNC: 3.9 MMOL/L — SIGNIFICANT CHANGE UP (ref 3.5–5.3)
RBC # BLD: 4.18 M/UL — SIGNIFICANT CHANGE UP (ref 3.8–5.2)
RBC # FLD: 13.6 % — SIGNIFICANT CHANGE UP (ref 10.3–14.5)
SODIUM SERPL-SCNC: 137 MMOL/L — SIGNIFICANT CHANGE UP (ref 135–145)
WBC # BLD: 5.51 K/UL — SIGNIFICANT CHANGE UP (ref 3.8–10.5)
WBC # FLD AUTO: 5.51 K/UL — SIGNIFICANT CHANGE UP (ref 3.8–10.5)

## 2024-05-15 PROCEDURE — 93010 ELECTROCARDIOGRAM REPORT: CPT

## 2024-05-15 RX ORDER — DEXTROSE 10 % IN WATER 10 %
125 INTRAVENOUS SOLUTION INTRAVENOUS ONCE
Refills: 0 | Status: DISCONTINUED | OUTPATIENT
Start: 2024-05-15 | End: 2024-05-16

## 2024-05-15 RX ORDER — LOSARTAN POTASSIUM 100 MG/1
1 TABLET, FILM COATED ORAL
Refills: 0 | DISCHARGE

## 2024-05-15 RX ORDER — SODIUM CHLORIDE 9 MG/ML
1000 INJECTION, SOLUTION INTRAVENOUS
Refills: 0 | Status: DISCONTINUED | OUTPATIENT
Start: 2024-05-15 | End: 2024-05-16

## 2024-05-15 RX ORDER — INSULIN GLARGINE 100 [IU]/ML
10 INJECTION, SOLUTION SUBCUTANEOUS AT BEDTIME
Refills: 0 | Status: DISCONTINUED | OUTPATIENT
Start: 2024-05-15 | End: 2024-05-16

## 2024-05-15 RX ORDER — GLUCAGON INJECTION, SOLUTION 0.5 MG/.1ML
1 INJECTION, SOLUTION SUBCUTANEOUS ONCE
Refills: 0 | Status: DISCONTINUED | OUTPATIENT
Start: 2024-05-15 | End: 2024-05-16

## 2024-05-15 RX ORDER — INSULIN LISPRO 100/ML
VIAL (ML) SUBCUTANEOUS
Refills: 0 | Status: DISCONTINUED | OUTPATIENT
Start: 2024-05-15 | End: 2024-05-16

## 2024-05-15 RX ORDER — ATORVASTATIN CALCIUM 80 MG/1
80 TABLET, FILM COATED ORAL AT BEDTIME
Refills: 0 | Status: DISCONTINUED | OUTPATIENT
Start: 2024-05-15 | End: 2024-05-16

## 2024-05-15 RX ORDER — SODIUM CHLORIDE 9 MG/ML
3 INJECTION INTRAMUSCULAR; INTRAVENOUS; SUBCUTANEOUS EVERY 8 HOURS
Refills: 0 | Status: DISCONTINUED | OUTPATIENT
Start: 2024-05-15 | End: 2024-05-16

## 2024-05-15 RX ORDER — EZETIMIBE 10 MG/1
10 TABLET ORAL DAILY
Refills: 0 | Status: DISCONTINUED | OUTPATIENT
Start: 2024-05-15 | End: 2024-05-16

## 2024-05-15 RX ORDER — CLOPIDOGREL BISULFATE 75 MG/1
75 TABLET, FILM COATED ORAL DAILY
Refills: 0 | Status: DISCONTINUED | OUTPATIENT
Start: 2024-05-16 | End: 2024-05-16

## 2024-05-15 RX ORDER — INSULIN LISPRO 100/ML
VIAL (ML) SUBCUTANEOUS AT BEDTIME
Refills: 0 | Status: DISCONTINUED | OUTPATIENT
Start: 2024-05-15 | End: 2024-05-16

## 2024-05-15 RX ORDER — INSULIN GLARGINE 100 [IU]/ML
14 INJECTION, SOLUTION SUBCUTANEOUS
Refills: 0 | DISCHARGE

## 2024-05-15 RX ORDER — ASPIRIN/CALCIUM CARB/MAGNESIUM 324 MG
81 TABLET ORAL DAILY
Refills: 0 | Status: DISCONTINUED | OUTPATIENT
Start: 2024-05-16 | End: 2024-05-16

## 2024-05-15 RX ORDER — DEXTROSE 50 % IN WATER 50 %
15 SYRINGE (ML) INTRAVENOUS ONCE
Refills: 0 | Status: DISCONTINUED | OUTPATIENT
Start: 2024-05-15 | End: 2024-05-16

## 2024-05-15 RX ORDER — DEXTROSE 50 % IN WATER 50 %
12.5 SYRINGE (ML) INTRAVENOUS ONCE
Refills: 0 | Status: DISCONTINUED | OUTPATIENT
Start: 2024-05-15 | End: 2024-05-16

## 2024-05-15 RX ORDER — METFORMIN HYDROCHLORIDE 850 MG/1
1 TABLET ORAL
Refills: 0 | DISCHARGE

## 2024-05-15 RX ORDER — LOSARTAN POTASSIUM 100 MG/1
50 TABLET, FILM COATED ORAL DAILY
Refills: 0 | Status: DISCONTINUED | OUTPATIENT
Start: 2024-05-15 | End: 2024-05-16

## 2024-05-15 RX ORDER — DEXTROSE 50 % IN WATER 50 %
25 SYRINGE (ML) INTRAVENOUS ONCE
Refills: 0 | Status: DISCONTINUED | OUTPATIENT
Start: 2024-05-15 | End: 2024-05-16

## 2024-05-15 RX ORDER — SODIUM CHLORIDE 9 MG/ML
500 INJECTION INTRAMUSCULAR; INTRAVENOUS; SUBCUTANEOUS
Refills: 0 | Status: DISCONTINUED | OUTPATIENT
Start: 2024-05-15 | End: 2024-05-16

## 2024-05-15 RX ORDER — SODIUM CHLORIDE 9 MG/ML
250 INJECTION INTRAMUSCULAR; INTRAVENOUS; SUBCUTANEOUS ONCE
Refills: 0 | Status: COMPLETED | OUTPATIENT
Start: 2024-05-15 | End: 2024-05-15

## 2024-05-15 RX ORDER — ERGOCALCIFEROL 1.25 MG/1
1 CAPSULE ORAL
Refills: 0 | DISCHARGE

## 2024-05-15 RX ADMIN — INSULIN GLARGINE 10 UNIT(S): 100 INJECTION, SOLUTION SUBCUTANEOUS at 22:19

## 2024-05-15 RX ADMIN — Medication 1: at 16:33

## 2024-05-15 RX ADMIN — SODIUM CHLORIDE 75 MILLILITER(S): 9 INJECTION INTRAMUSCULAR; INTRAVENOUS; SUBCUTANEOUS at 13:25

## 2024-05-15 RX ADMIN — SODIUM CHLORIDE 1000 MILLILITER(S): 9 INJECTION INTRAMUSCULAR; INTRAVENOUS; SUBCUTANEOUS at 13:25

## 2024-05-15 RX ADMIN — SODIUM CHLORIDE 3 MILLILITER(S): 9 INJECTION INTRAMUSCULAR; INTRAVENOUS; SUBCUTANEOUS at 21:59

## 2024-05-15 RX ADMIN — ATORVASTATIN CALCIUM 80 MILLIGRAM(S): 80 TABLET, FILM COATED ORAL at 22:22

## 2024-05-15 RX ADMIN — SODIUM CHLORIDE 3 MILLILITER(S): 9 INJECTION INTRAMUSCULAR; INTRAVENOUS; SUBCUTANEOUS at 15:13

## 2024-05-15 NOTE — PATIENT PROFILE ADULT - FALL HARM RISK - HARM RISK INTERVENTIONS

## 2024-05-15 NOTE — H&P CARDIOLOGY - HISTORY OF PRESENT ILLNESS
64 y/o F w/ PMH of HTN, HLD and DM2 presents for cardiac catheretization. Pt had an episode of severe left sided chest pain on March 15th and was evaluated at [place] [facility]. Pt also has been experiencing DIXON. ACS was ruled out with x2 negative troponin. Pt was admitted at that time for ischemic workup and was found to have an abnormal stress test with mg-infarct ischemia in the distal LAD territory, apical hypokinesis and normal LVEF of 64%. Pt's pain has not reoccurred since that time. Pt denies N/V/D, fevers, chills, cough, palpitations, syncope, orthopnea, nocturnal paroxysmal dyspnea, edema, cyanosis, hypertension, heart murmurs, varicosities, phlebitis, claudication.

## 2024-05-15 NOTE — CHART NOTE - NSCHARTNOTEFT_GEN_A_CORE
Pt went for cath procedure today with Right radial accessed. Pt denies SOB, CP, swelling, edema, paresthesia distal to site. Site checked. No hematoma or swelling within access point. Active and passive ROM of R shoulder, R elbow, R wrist, and fingers without deficits. Motor and Sensory intact. 2+ peripheral pulses intact. Capillary refill less than 2 seconds. Pt educated to look out for bruising, swelling, tingling, and numbness of site/distal to site and notify RN. Will continue to monitor overnight.    Amarilys Diaz, [facility]  Medicine [id number]

## 2024-05-16 ENCOUNTER — TRANSCRIPTION ENCOUNTER (OUTPATIENT)
Age: 64
End: 2024-05-16

## 2024-05-16 VITALS
DIASTOLIC BLOOD PRESSURE: 51 MMHG | RESPIRATION RATE: 19 BRPM | HEART RATE: 91 BPM | TEMPERATURE: 98 F | OXYGEN SATURATION: 100 % | SYSTOLIC BLOOD PRESSURE: 127 MMHG

## 2024-05-16 LAB
ANION GAP SERPL CALC-SCNC: 9 MMOL/L — SIGNIFICANT CHANGE UP (ref 7–14)
BUN SERPL-MCNC: 13 MG/DL — SIGNIFICANT CHANGE UP (ref 7–23)
CALCIUM SERPL-MCNC: 9 MG/DL — SIGNIFICANT CHANGE UP (ref 8.4–10.5)
CHLORIDE SERPL-SCNC: 104 MMOL/L — SIGNIFICANT CHANGE UP (ref 98–107)
CO2 SERPL-SCNC: 25 MMOL/L — SIGNIFICANT CHANGE UP (ref 22–31)
CREAT SERPL-MCNC: 0.68 MG/DL — SIGNIFICANT CHANGE UP (ref 0.5–1.3)
EGFR: 97 ML/MIN/1.73M2 — SIGNIFICANT CHANGE UP
GLUCOSE BLDC GLUCOMTR-MCNC: 132 MG/DL — HIGH (ref 70–99)
GLUCOSE BLDC GLUCOMTR-MCNC: 194 MG/DL — HIGH (ref 70–99)
GLUCOSE BLDC GLUCOMTR-MCNC: 273 MG/DL — HIGH (ref 70–99)
GLUCOSE BLDC GLUCOMTR-MCNC: 58 MG/DL — LOW (ref 70–99)
GLUCOSE BLDC GLUCOMTR-MCNC: 64 MG/DL — LOW (ref 70–99)
GLUCOSE SERPL-MCNC: 207 MG/DL — HIGH (ref 70–99)
HCT VFR BLD CALC: 35.4 % — SIGNIFICANT CHANGE UP (ref 34.5–45)
HGB BLD-MCNC: 11.4 G/DL — LOW (ref 11.5–15.5)
MAGNESIUM SERPL-MCNC: 2.1 MG/DL — SIGNIFICANT CHANGE UP (ref 1.6–2.6)
MCHC RBC-ENTMCNC: 27.4 PG — SIGNIFICANT CHANGE UP (ref 27–34)
MCHC RBC-ENTMCNC: 32.2 GM/DL — SIGNIFICANT CHANGE UP (ref 32–36)
MCV RBC AUTO: 85.1 FL — SIGNIFICANT CHANGE UP (ref 80–100)
NRBC # BLD: 0 /100 WBCS — SIGNIFICANT CHANGE UP (ref 0–0)
NRBC # FLD: 0 K/UL — SIGNIFICANT CHANGE UP (ref 0–0)
PHOSPHATE SERPL-MCNC: 3.5 MG/DL — SIGNIFICANT CHANGE UP (ref 2.5–4.5)
PLATELET # BLD AUTO: 320 K/UL — SIGNIFICANT CHANGE UP (ref 150–400)
POTASSIUM SERPL-MCNC: 4.1 MMOL/L — SIGNIFICANT CHANGE UP (ref 3.5–5.3)
POTASSIUM SERPL-SCNC: 4.1 MMOL/L — SIGNIFICANT CHANGE UP (ref 3.5–5.3)
RBC # BLD: 4.16 M/UL — SIGNIFICANT CHANGE UP (ref 3.8–5.2)
RBC # FLD: 13.6 % — SIGNIFICANT CHANGE UP (ref 10.3–14.5)
SODIUM SERPL-SCNC: 138 MMOL/L — SIGNIFICANT CHANGE UP (ref 135–145)
WBC # BLD: 5.84 K/UL — SIGNIFICANT CHANGE UP (ref 3.8–10.5)
WBC # FLD AUTO: 5.84 K/UL — SIGNIFICANT CHANGE UP (ref 3.8–10.5)

## 2024-05-16 RX ORDER — EZETIMIBE 10 MG/1
1 TABLET ORAL
Qty: 30 | Refills: 0
Start: 2024-05-16 | End: 2024-06-14

## 2024-05-16 RX ORDER — ATORVASTATIN CALCIUM 80 MG/1
1 TABLET, FILM COATED ORAL
Refills: 0 | DISCHARGE

## 2024-05-16 RX ORDER — ATORVASTATIN CALCIUM 80 MG/1
1 TABLET, FILM COATED ORAL
Qty: 30 | Refills: 0
Start: 2024-05-16 | End: 2024-06-14

## 2024-05-16 RX ORDER — CLOPIDOGREL BISULFATE 75 MG/1
1 TABLET, FILM COATED ORAL
Qty: 30 | Refills: 0
Start: 2024-05-16 | End: 2024-06-14

## 2024-05-16 RX ADMIN — EZETIMIBE 10 MILLIGRAM(S): 10 TABLET ORAL at 12:19

## 2024-05-16 RX ADMIN — CLOPIDOGREL BISULFATE 75 MILLIGRAM(S): 75 TABLET, FILM COATED ORAL at 12:19

## 2024-05-16 RX ADMIN — SODIUM CHLORIDE 3 MILLILITER(S): 9 INJECTION INTRAMUSCULAR; INTRAVENOUS; SUBCUTANEOUS at 05:38

## 2024-05-16 RX ADMIN — Medication 1: at 08:49

## 2024-05-16 RX ADMIN — SODIUM CHLORIDE 3 MILLILITER(S): 9 INJECTION INTRAMUSCULAR; INTRAVENOUS; SUBCUTANEOUS at 16:01

## 2024-05-16 RX ADMIN — Medication 3: at 17:10

## 2024-05-16 RX ADMIN — LOSARTAN POTASSIUM 50 MILLIGRAM(S): 100 TABLET, FILM COATED ORAL at 06:31

## 2024-05-16 RX ADMIN — Medication 81 MILLIGRAM(S): at 12:19

## 2024-05-16 NOTE — DISCHARGE NOTE PROVIDER - NSDCCPCAREPLAN_GEN_ALL_CORE_FT
PRINCIPAL DISCHARGE DIAGNOSIS  Diagnosis: Chest pain  Assessment and Plan of Treatment: You presented with chest pain and you were seen by the cardiologist and had catheterization (angiogram) whic hshowed blockages in several vessels and stent was placed in. Please continue with current meds as prescribed. You can start metformin again in 48hrs post catheterization. Please continue to follow up with your outpatient cardiologist and primary care provider for further management and monitoring.     PRINCIPAL DISCHARGE DIAGNOSIS  Diagnosis: Chest pain  Assessment and Plan of Treatment: You presented with chest pain and you were seen by the cardiologist and had catheterization (angiogram) whic hshowed blockages in several vessels and stent was placed in. Please continue with current meds as prescribed. You can start metformin again in 48hrs post catheterization. Please continue to follow up with your outpatient cardiologist and primary care provider for further management and monitoring. Please call: [phone] (Dr. Thomas's office) to make outpatient follow up within 1-2 weeks of discharge.

## 2024-05-16 NOTE — PROVIDER CONTACT NOTE (HYPOGLYCEMIA EVENT) - NS PROVIDER CONTACT BACKGROUND-HYPO
Age: 63y    Gender: Female    POCT Blood Glucose:  132 mg/dL (05-16-24 @ 12:16)  58 mg/dL (05-16-24 @ 11:49)  64 mg/dL (05-16-24 @ 11:48)  194 mg/dL (05-16-24 @ 08:16)  244 mg/dL (05-15-24 @ 21:20)  192 mg/dL (05-15-24 @ 16:08)  233 mg/dL (05-15-24 @ 13:05)      eMAR:atorvastatin   80 milliGRAM(s) Oral (05-15-24 @ 22:22)    ezetimibe   10 milliGRAM(s) Oral (05-16-24 @ 12:19)    insulin glargine Injectable (LANTUS)   10 Unit(s) SubCutaneous (05-15-24 @ 22:19)    insulin lispro (ADMELOG) corrective regimen sliding scale   1 Unit(s) SubCutaneous (05-16-24 @ 08:49)   1 Unit(s) SubCutaneous (05-15-24 @ 16:33)

## 2024-05-16 NOTE — DISCHARGE NOTE NURSING/CASE MANAGEMENT/SOCIAL WORK - PATIENT PORTAL LINK FT
You can access the FollowMyHealth Patient Portal offered by [facility] by registering at the following website: [email or web address]. By joining [facility]’s FollowMyHealth portal, you will also be able to view your health information using other applications (apps) compatible with our system.

## 2024-05-16 NOTE — DISCHARGE NOTE NURSING/CASE MANAGEMENT/SOCIAL WORK - FLU SEASON?
Physician Progress Note      Radha Gómez  [facility] #:                  [id number]  :                       1969  ADMIT DATE:       2021 10:06 PM  DISCH DATE:  RESPONDING  PROVIDER #:        ROSELIA SOL DO          QUERY TEXT:    Dear Dr Renetta Mac  Patient admitted with Sepsis on H+P poa  . Noted documentation of sepsis in H+P . In order to support the diagnosis of sepsis , please include additional clinical indicators in your documentation ie acute organ dysfunctions if applicable . Or please document if the diagnosis of sepsis  has been ruled out after further study. The medical record reflects the following:  Risk Factors: admitted for severe  sepsis 2/2 L great toe diabetic foot infection w/abscess and osteomyelitis. ? Clinical Indicators: MDPN Sepsis- POA, with fever, tachycardia, tachypnea  Temp 103 pul. 113,  JORGE. He has been hydrated lactate is negative ABX given. Treatment: IV  abx per ID- Zosyn and Vancomycin , IVFs NS Labs monitored carefully ,PT,OT PICC line ordered ,amp Lt great toe    Thank you  Miguel Estrada RN    Options provided:  -- Sepsis present as evidenced by, Please document evidence. -- Sepsis was ruled out after study  -- Other - I will add my own diagnosis  -- Disagree - Not applicable / Not valid  -- Disagree - Clinically unable to determine / Unknown  -- Refer to Clinical Documentation Reviewer    PROVIDER RESPONSE TEXT:    Sepsis is present as evidenced by fever, tachycardia, tachypnea    Query created by: Francisco Montoya on 2021 4:47 PM      QUERY TEXT:    Dear Hospitalist  Patient admitted with Severe Sepsis due to  Diabetic foot ulcer, L great toe - concern for osteomyelitis  . Noted documentation of Acute Kidney Injury in H+P  on  21 . In order to support the diagnosis of JORGE, please include additional clinical indicators in your documentation.   Or please document if the diagnosis of JORGE has been ruled out after No further study. The medical record reflects the following:  Risk Factors: Diabetic foot ulcer, L great toe - concern for osteomyelitis Sepsis, POA, secondary to above  Clinical Indicators: MDPN 103 degree fever and uncontrolled hyperglycemia BUN/Creat.21/1.40 6/22-bun/crt 9/0.82 6/23 8/1.02  Treatment: IV Flds NS  ,  zosyn and vancomycin ,labs monitored carefully    Defined by Kidney Disease Improving Global Outcomes (KDIGO) clinical practice guideline for acute kidney injury:  -Increase in SCr by greater than or equal to 0.3 mg/dl within 48 hours; or  -Increase or decrease in SCr to greater than or equal to 1.5 times baseline, which is known or presumed to have occurred within the prior 7 days; or  -Urine volume < 0.5ml/kg/h for 6 hours    Thank you    Helene Ren RN      Options provided:  -- Acute kidney injury evidenced by, Please document evidence as well as baseline creatinine, if known. -- Currently resolved acute kidney injury was evidenced by, Please document evidence as well as baseline creatinine, if known. -- Acute kidney injury ruled out after study  -- Other - I will add my own diagnosis  -- Disagree - Not applicable / Not valid  -- Disagree - Clinically unable to determine / Unknown  -- Refer to Clinical Documentation Reviewer    PROVIDER RESPONSE TEXT:    This patient has an acute kidney injury as evidenced by Creatinine 1.40 on admission, baseline creatinine 1.0    Query created by:  Ruby Mendosa on 6/23/2021 5:03 PM      Electronically signed by:  Christin Jackson DO 6/24/2021 11:27 AM

## 2024-05-16 NOTE — DISCHARGE NOTE PROVIDER - HOSPITAL COURSE
64 y/o F w/ PMH of HTN, HLD and DM2 presented for cardiac catheretization. Pt had an episode of severe left sided chest pain on March 15th and was evaluated at [place] ED. Pt also has been experiencing DIXON. ACS was ruled out with x2 negative troponin. Pt was admitted at that time for ischemic workup and was found to have an abnormal stress test with mg-infarct ischemia in the distal LAD territory, apical hypokinesis and normal LVEF of 64%. Pt's pain has not reoccurred since that time. Pt denies N/V/D, fevers, chills, cough, palpitations, syncope, orthopnea, nocturnal paroxysmal dyspnea, edema, cyanosis, hypertension, heart murmurs, varicosities, phlebitis, claudication. S/p LHC on 5/15: mLCx 80%, OM1 70%, pRCA 95% x1 RUBIN, RRA accessed, ASA + Plavix. Patient is cleared for discharge to home with new meds. 62 y/o F w/ PMH of HTN, HLD and DM2 presented for cardiac catheretization. Pt had an episode of severe left sided chest pain on March 15th and was evaluated at [place] ED. Pt also has been experiencing DIXON. ACS was ruled out with x2 negative troponin. Pt was admitted at that time for ischemic workup and was found to have an abnormal stress test with mg-infarct ischemia in the distal LAD territory, apical hypokinesis and normal LVEF of 64%. Pt's pain has not reoccurred since that time. Pt denies N/V/D, fevers, chills, cough, palpitations, syncope, orthopnea, nocturnal paroxysmal dyspnea, edema, cyanosis, hypertension, heart murmurs, varicosities, phlebitis, claudication. S/p LHC on 5/15: mLCx 80%, OM1 70%, pRCA 95% x1 RUBIN, RRA accessed, ASA + Plavix. Patient is cleared for discharge to home with new meds.

## 2024-05-16 NOTE — DISCHARGE NOTE PROVIDER - NSDCMRMEDTOKEN_GEN_ALL_CORE_FT
aspirin 81 mg oral tablet, chewable: 1 tab(s) orally once a day  atorvastatin 80 mg oral tablet: 1 tab(s) orally once a day (at bedtime)  clopidogrel 75 mg oral tablet: 1 tab(s) orally once a day  ergocalciferol 1.25 mg (50,000 intl units) oral capsule: 1 cap(s) orally once a week  ezetimibe 10 mg oral tablet: 1 tab(s) orally once a day  glipiZIDE 2.5 mg oral tablet: 1 tab(s) orally 2 times a day  Lantus 100 units/mL subcutaneous solution: 14 unit(s) subcutaneous once a day (at bedtime)  losartan 50 mg oral tablet: 1 tab(s) orally once a day  metFORMIN 500 mg oral tablet: 1 tab(s) orally 2 times a day

## 2024-05-16 NOTE — DISCHARGE NOTE NURSING/CASE MANAGEMENT/SOCIAL WORK - NURSING SECTION COMPLETE
Total Body Energy: 7334 Maintenance Detail Level: Generalized Protocol For Broad Band Uvb: The patient received Broad Band UVB. Consent: Written consent obtained.  The risks were reviewed with the patient including but not limited to: burn, pigmentary changes, pain, blistering, scabbing, redness, increased risk of skin cancers, and the remote possibility of scarring. Patient/Caregiver provided printed discharge information. Protocol For Photochemotherapy For Severe Photoresponsive Dermatoses: Petrolatum And Broad Band Uvb: The patient received Photochemotherapyfor severe photoresponsive dermatoses: Petrolatum and Broad Band UVB requiring at least 4 to 8 hours of care under direct physician supervision. Protocol For Puva: The patient received PUVA. Skin Type: IV Render Post-Care In The Note: no Protocol For Uva1: The patient received UVA1. Protocol For Photochemotherapy: Triamcinolone Ointment And Nbuvb: The patient received Photochemotherapy: Triamcinolone and NBUVB (triamcinolone ointment applied to all lesions prior to phototherapy). Protocol For Photochemotherapy: Mineral Oil And Nbuvb: The patient received Photochemotherapy: Mineral Oil and NBUVB (mineral oil applied to all lesions prior to phototherapy). Protocol For Protocol For Photochemotherapy For Severe Photoresponsive Dermatoses: Bath Puva: The patient received Photochemotherapy for severe photoresponsive dermatoses: Bath PUVA requiring at least 4 to 8 hours of care under direct physician supervision. Protocol For Nb Uva: The patient received NB UVA. Protocol For Photochemotherapy For Severe Photoresponsive Dermatoses: Petrolatum And Nbuvb: The patient received Photochemotherapy for severe photoresponsive dermatoses: Petrolatum and NBUVB requiring at least 4 to 8 hours of care under direct physician supervision. Comments On Previous Treatment: Patient states she had no issues with her last treatment and was ok to proceed at maintenance dose as usual. Advised of PPE and sunscreen. Maintainance dose continued. Protocol For Photochemotherapy: Mineral Oil And Broad Band Uvb: The patient received Photochemotherapy: Mineral Oil and Broad Band UVB. Total Body Time: 29:36 Total Treatment Time: 27:54 Protocol For Uva: The patient received UVA. Protocol For Photochemotherapy: Petrolatum And Nbuvb: The patient received Photochemotherapy: Petrolatum and NBUVB (petrolatum applied to all lesions prior to phototherapy). Protocol For Photochemotherapy For Severe Photoresponsive Dermatoses: Puva: The patient received Photochemotherapy for severe photoresponsive dermatoses: PUVA requiring at least 4 to 8 hours of care under direct physician supervision. Name Of Supervising Technician: Matthias Protocol: NBUVB Protocol For Photochemotherapy: Baby Oil And Nbuvb: The patient received Photochemotherapy: Baby Oil and NBUVB (baby oil applied to all lesions prior to phototherapy). Treatment Number: 28 Protocol For Photochemotherapy: Tar And Broad Band Uvb (Goeckerman Treatment): The patient received Photochemotherapy: Tar and Broad Band UVB (Goeckerman treatment). Protocol For Photochemotherapy: Petrolatum And Broad Band Uvb: The patient received Photochemotherapy: Petrolatum and Broad Band UVB. Changes In Treatment Protocol: Continue on maintenance dose, 7334mJ. Protocol For Nbuvb: The patient received NBUVB. Treatment Number: 121 Protocol For Photochemotherapy For Severe Photoresponsive Dermatoses: Tar And Broad Band Uvb (Goeckerman Treatment): The patient received Photochemotherapy for severe photoresponsive dermatoses: Tar and Broad Band UVB (Goeckerman treatment) requiring at least 4 to 8 hours of care under direct physician supervision. Post-Care Instructions: I reviewed with the patient in detail post-care instructions. Patient is to wear sun protection. Patients may expect sunburn like redness, discomfort and scabbing. Protocol For Photochemotherapy: Tar And Nbuvb (Goeckerman Treatment): The patient received Photochemotherapy: Tar and NBUVB (Goeckerman treatment). Protocol For Photochemotherapy For Severe Photoresponsive Dermatoses: Tar And Nbuvb (Goeckerman Treatment): The patient received Photochemotherapy for severe photoresponsive dermatoses: Tar and NBUVB (Goeckerman treatment) requiring at least 4 to 8 hours of care under direct physician supervision. Protocol For Bath Puva: The patient received Bath PUVA.

## 2024-05-28 ENCOUNTER — NON-APPOINTMENT (OUTPATIENT)
Age: 64
End: 2024-05-28

## 2024-05-29 PROBLEM — Z00.00 ENCOUNTER FOR PREVENTIVE HEALTH EXAMINATION: Status: ACTIVE | Noted: 2024-05-29

## 2024-05-30 ENCOUNTER — EMERGENCY (EMERGENCY)
Facility: HOSPITAL | Age: 64
LOS: 1 days | Discharge: HOME | End: 2024-05-30
Attending: EMERGENCY MEDICINE | Admitting: EMERGENCY MEDICINE
Payer: MEDICARE

## 2024-05-30 ENCOUNTER — APPOINTMENT (OUTPATIENT)
Dept: INTERNAL MEDICINE | Facility: CLINIC | Age: 64
End: 2024-05-30

## 2024-05-30 VITALS
RESPIRATION RATE: 18 BRPM | OXYGEN SATURATION: 99 % | HEART RATE: 105 BPM | TEMPERATURE: 98 F | DIASTOLIC BLOOD PRESSURE: 100 MMHG | SYSTOLIC BLOOD PRESSURE: 208 MMHG

## 2024-05-30 DIAGNOSIS — Z90.710 ACQUIRED ABSENCE OF BOTH CERVIX AND UTERUS: Chronic | ICD-10-CM

## 2024-05-30 DIAGNOSIS — Z98.891 HISTORY OF UTERINE SCAR FROM PREVIOUS SURGERY: Chronic | ICD-10-CM

## 2024-05-30 PROCEDURE — 99285 EMERGENCY DEPT VISIT HI MDM: CPT

## 2024-05-30 PROCEDURE — 93010 ELECTROCARDIOGRAM REPORT: CPT

## 2024-05-30 PROCEDURE — 99053 MED SERV 10PM-8AM 24 HR FAC: CPT

## 2024-05-31 VITALS
TEMPERATURE: 98 F | RESPIRATION RATE: 18 BRPM | DIASTOLIC BLOOD PRESSURE: 84 MMHG | HEART RATE: 80 BPM | SYSTOLIC BLOOD PRESSURE: 193 MMHG | OXYGEN SATURATION: 100 %

## 2024-05-31 LAB
ALBUMIN SERPL ELPH-MCNC: 4.3 G/DL — SIGNIFICANT CHANGE UP (ref 3.3–5)
ALP SERPL-CCNC: 88 U/L — SIGNIFICANT CHANGE UP (ref 40–120)
ALT FLD-CCNC: 23 U/L — SIGNIFICANT CHANGE UP (ref 4–33)
ANION GAP SERPL CALC-SCNC: 14 MMOL/L — SIGNIFICANT CHANGE UP (ref 7–14)
AST SERPL-CCNC: 19 U/L — SIGNIFICANT CHANGE UP (ref 4–32)
BASE EXCESS BLDV CALC-SCNC: 2.2 MMOL/L — SIGNIFICANT CHANGE UP (ref -2–3)
BASOPHILS # BLD AUTO: 0.05 K/UL — SIGNIFICANT CHANGE UP (ref 0–0.2)
BASOPHILS NFR BLD AUTO: 0.7 % — SIGNIFICANT CHANGE UP (ref 0–2)
BILIRUB SERPL-MCNC: 0.2 MG/DL — SIGNIFICANT CHANGE UP (ref 0.2–1.2)
BLOOD GAS VENOUS COMPREHENSIVE RESULT: SIGNIFICANT CHANGE UP
BUN SERPL-MCNC: 15 MG/DL — SIGNIFICANT CHANGE UP (ref 7–23)
CALCIUM SERPL-MCNC: 9.6 MG/DL — SIGNIFICANT CHANGE UP (ref 8.4–10.5)
CHLORIDE BLDV-SCNC: 100 MMOL/L — SIGNIFICANT CHANGE UP (ref 96–108)
CHLORIDE SERPL-SCNC: 98 MMOL/L — SIGNIFICANT CHANGE UP (ref 98–107)
CO2 BLDV-SCNC: 29.8 MMOL/L — HIGH (ref 22–26)
CO2 SERPL-SCNC: 24 MMOL/L — SIGNIFICANT CHANGE UP (ref 22–31)
CREAT SERPL-MCNC: 0.63 MG/DL — SIGNIFICANT CHANGE UP (ref 0.5–1.3)
EGFR: 98 ML/MIN/1.73M2 — SIGNIFICANT CHANGE UP
EOSINOPHIL # BLD AUTO: 0.17 K/UL — SIGNIFICANT CHANGE UP (ref 0–0.5)
EOSINOPHIL NFR BLD AUTO: 2.3 % — SIGNIFICANT CHANGE UP (ref 0–6)
GAS PNL BLDV: 133 MMOL/L — LOW (ref 136–145)
GLUCOSE BLDV-MCNC: 290 MG/DL — HIGH (ref 70–99)
GLUCOSE SERPL-MCNC: 292 MG/DL — HIGH (ref 70–99)
HCO3 BLDV-SCNC: 28 MMOL/L — SIGNIFICANT CHANGE UP (ref 22–29)
HCT VFR BLD CALC: 37.1 % — SIGNIFICANT CHANGE UP (ref 34.5–45)
HCT VFR BLDA CALC: 38 % — SIGNIFICANT CHANGE UP (ref 34.5–46.5)
HGB BLD CALC-MCNC: 12.6 G/DL — SIGNIFICANT CHANGE UP (ref 11.7–16.1)
HGB BLD-MCNC: 12 G/DL — SIGNIFICANT CHANGE UP (ref 11.5–15.5)
IANC: 3.42 K/UL — SIGNIFICANT CHANGE UP (ref 1.8–7.4)
IMM GRANULOCYTES NFR BLD AUTO: 0.1 % — SIGNIFICANT CHANGE UP (ref 0–0.9)
LACTATE BLDV-MCNC: 0.8 MMOL/L — SIGNIFICANT CHANGE UP (ref 0.5–2)
LYMPHOCYTES # BLD AUTO: 3.34 K/UL — HIGH (ref 1–3.3)
LYMPHOCYTES # BLD AUTO: 44.4 % — HIGH (ref 13–44)
MCHC RBC-ENTMCNC: 27 PG — SIGNIFICANT CHANGE UP (ref 27–34)
MCHC RBC-ENTMCNC: 32.3 GM/DL — SIGNIFICANT CHANGE UP (ref 32–36)
MCV RBC AUTO: 83.4 FL — SIGNIFICANT CHANGE UP (ref 80–100)
MONOCYTES # BLD AUTO: 0.54 K/UL — SIGNIFICANT CHANGE UP (ref 0–0.9)
MONOCYTES NFR BLD AUTO: 7.2 % — SIGNIFICANT CHANGE UP (ref 2–14)
NEUTROPHILS # BLD AUTO: 3.42 K/UL — SIGNIFICANT CHANGE UP (ref 1.8–7.4)
NEUTROPHILS NFR BLD AUTO: 45.3 % — SIGNIFICANT CHANGE UP (ref 43–77)
NRBC # BLD: 0 /100 WBCS — SIGNIFICANT CHANGE UP (ref 0–0)
NRBC # FLD: 0 K/UL — SIGNIFICANT CHANGE UP (ref 0–0)
PCO2 BLDV: 49 MMHG — SIGNIFICANT CHANGE UP (ref 39–52)
PH BLDV: 7.37 — SIGNIFICANT CHANGE UP (ref 7.32–7.43)
PLATELET # BLD AUTO: 349 K/UL — SIGNIFICANT CHANGE UP (ref 150–400)
PO2 BLDV: 27 MMHG — SIGNIFICANT CHANGE UP (ref 25–45)
POTASSIUM BLDV-SCNC: 3.8 MMOL/L — SIGNIFICANT CHANGE UP (ref 3.5–5.1)
POTASSIUM SERPL-MCNC: 3.8 MMOL/L — SIGNIFICANT CHANGE UP (ref 3.5–5.3)
POTASSIUM SERPL-SCNC: 3.8 MMOL/L — SIGNIFICANT CHANGE UP (ref 3.5–5.3)
PROT SERPL-MCNC: 7.4 G/DL — SIGNIFICANT CHANGE UP (ref 6–8.3)
RBC # BLD: 4.45 M/UL — SIGNIFICANT CHANGE UP (ref 3.8–5.2)
RBC # FLD: 13.2 % — SIGNIFICANT CHANGE UP (ref 10.3–14.5)
SAO2 % BLDV: 40.6 % — LOW (ref 67–88)
SODIUM SERPL-SCNC: 136 MMOL/L — SIGNIFICANT CHANGE UP (ref 135–145)
WBC # BLD: 7.53 K/UL — SIGNIFICANT CHANGE UP (ref 3.8–10.5)
WBC # FLD AUTO: 7.53 K/UL — SIGNIFICANT CHANGE UP (ref 3.8–10.5)

## 2024-05-31 RX ORDER — IBUPROFEN 200 MG
1 TABLET ORAL
Qty: 15 | Refills: 0
Start: 2024-05-31 | End: 2024-06-04

## 2024-05-31 RX ORDER — OXYCODONE HYDROCHLORIDE 5 MG/1
1 TABLET ORAL
Qty: 9 | Refills: 0
Start: 2024-05-31 | End: 2024-06-02

## 2024-05-31 RX ORDER — METHOCARBAMOL 500 MG/1
2 TABLET, FILM COATED ORAL
Qty: 42 | Refills: 0
Start: 2024-05-31 | End: 2024-06-06

## 2024-05-31 RX ORDER — ACETAMINOPHEN 500 MG
3 TABLET ORAL
Qty: 60 | Refills: 0
Start: 2024-05-31 | End: 2024-06-04

## 2024-05-31 RX ORDER — LIDOCAINE 4 G/100G
1 CREAM TOPICAL ONCE
Refills: 0 | Status: COMPLETED | OUTPATIENT
Start: 2024-05-31 | End: 2024-05-31

## 2024-05-31 RX ORDER — DEXAMETHASONE 0.5 MG/5ML
6 ELIXIR ORAL ONCE
Refills: 0 | Status: COMPLETED | OUTPATIENT
Start: 2024-05-31 | End: 2024-05-31

## 2024-05-31 RX ORDER — KETOROLAC TROMETHAMINE 30 MG/ML
15 SYRINGE (ML) INJECTION ONCE
Refills: 0 | Status: DISCONTINUED | OUTPATIENT
Start: 2024-05-31 | End: 2024-05-31

## 2024-05-31 RX ORDER — MORPHINE SULFATE 50 MG/1
4 CAPSULE, EXTENDED RELEASE ORAL ONCE
Refills: 0 | Status: DISCONTINUED | OUTPATIENT
Start: 2024-05-31 | End: 2024-05-31

## 2024-05-31 RX ADMIN — Medication 15 MILLIGRAM(S): at 01:54

## 2024-05-31 RX ADMIN — MORPHINE SULFATE 4 MILLIGRAM(S): 50 CAPSULE, EXTENDED RELEASE ORAL at 03:14

## 2024-05-31 RX ADMIN — MORPHINE SULFATE 4 MILLIGRAM(S): 50 CAPSULE, EXTENDED RELEASE ORAL at 01:54

## 2024-05-31 RX ADMIN — LIDOCAINE 1 PATCH: 4 CREAM TOPICAL at 02:03

## 2024-05-31 RX ADMIN — Medication 15 MILLIGRAM(S): at 03:14

## 2024-05-31 RX ADMIN — Medication 6 MILLIGRAM(S): at 01:54

## 2024-05-31 NOTE — ED PROVIDER NOTE - PROGRESS NOTE DETAILS
Frandy, PGY-3, EM: pt pain resolved, discussed need for f/u with her pain doctor and also spine to consider PT. return precautions discussed, will send some pain medications to her pharmacy.

## 2024-05-31 NOTE — ED PROVIDER NOTE - NSFOLLOWUPINSTRUCTIONS_ED_ALL_ED_FT
Please follow up with your primary care physician within 2-3 days.   Return to the ER for any new or concerning symptoms.   You may take 975 mg acetaminophen every 6 hours as needed for pain.  You may take 600mg Ibuprofen (Advil) once every 8 hours as needed for pain. See medication label for warnings and use instructions.     Follow-up with the [facility] at [phone] ([phone])     Contact a health care provider if:  Your back pain does not improve after 6 weeks of treatment.  Your symptoms get worse.  Get help right away if:  Your back pain is severe.  You are unable to stand or walk.  You develop pain in your legs.  You develop weakness in your buttocks or legs.  You have difficulty controlling when you urinate or when you have a bowel movement.  This information is not intended to replace advice given to you by your health care provider. Make sure you discuss any questions you have with your health care provider.      SEEK IMMEDIATE MEDICAL CARE IF YOU HAVE ANY OF THE FOLLOWING SYMPTOMS: bowel or bladder control problems, unusual weakness or numbness in your arms or legs, nausea or vomiting, abdominal pain, fever, dizziness/lightheadedness.

## 2024-05-31 NOTE — ED PROVIDER NOTE - PHYSICAL EXAMINATION
General: well-appearing, no acute distress  Head: Atraumatic, normocephalic  Eyes: EOM grossly in tact, no scleral icterus, no discharge  ENT: moist mucous membranes  Neurology: A&Ox 3, nonfocal, DUEÑAS x 4  Respiratory: CTAB, no wheezing, normal respiratory effort  CV: RRR, good s1/s2, no S3, Extremities warm and well perfused  Abdominal: Soft, non-distended, non-tender, no masses  Extremities: No edema, no deformities  + SLR on R  2+ DP pulse b/l  Skin: warm and dry. No rashes

## 2024-05-31 NOTE — ED ADULT NURSE NOTE - OBJECTIVE STATEMENT
Pt received in room 11, aaox4, ambulatory, breathing even and unlabored in bed. Pt states that she has been experiencing throbbing severe pain in her right leg for a few days making her need to walk with a cane. Pt denies any trauma to the leg. Pt appears uncomfortable. Pt denies chest pain, SOB, dizziness, headache, blurry vision, chills. Bed in lowest position, call bell within reach. #20G IV placed in the left AC, labs drawn and sent.

## 2024-05-31 NOTE — ED PROVIDER NOTE - OBJECTIVE STATEMENT
64-year-old female with a past medical history of type 2 diabetes, hypertension, chronic back pain presenting with concern of right-sided sharp pain in her gluteus area shooting down towards her foot.  Patient states for the past few days has been severe, has been taking all her home medications including restarting Lyrica that she was previously taking for similar pain on the left side.  No recent injections in her back, history of cancer, falls or trauma, incontinence, new constipation, weakness. She previously saw a pain doctor who performed injections in her back years ago which worked for this.

## 2024-05-31 NOTE — ED ADULT NURSE REASSESSMENT NOTE - NS ED NURSE REASSESS COMMENT FT1
Pt at baseline mental status, breathing even and unlabored in bed. Pt reporting a decrease in pain to a comfortable level. Pt denies chest pain, SOB, dizziness, headache, blurry vision, chills. Bed in lowest position, call bell within reach.

## 2024-05-31 NOTE — ED PROVIDER NOTE - ATTENDING CONTRIBUTION TO CARE
patient with sciatic s/s w/o "red flags" that responded nicely to IV meds with suitable PO equivelents will d/c with same and outpt f/u as needed  RTED prn

## 2024-05-31 NOTE — ED PROVIDER NOTE - PATIENT PORTAL LINK FT
You can access the FollowMyHealth Patient Portal offered by [facility] by registering at the following website: [email or web address]. By joining [employer]’s FollowMyHealth portal, you will also be able to view your health information using other applications (apps) compatible with our system.

## 2024-05-31 NOTE — ED PROVIDER NOTE - CLINICAL SUMMARY MEDICAL DECISION MAKING FREE TEXT BOX
64-year-old female with a past medical history of type 2 diabetes, hypertension, chronic back pain presenting with concern of right-sided sharp pain in her gluteus area shooting down towards her foot. Differential diagnosis includes but is not limited to sciatica, MSK pain, lumbar radiculopathy. Pt with hx of sciatica, similar to prior flares. htn on arrival improved here when reassessed even prior to pain medications. would check basic labs but no sx of end organ damage, no CP. will tx symtomatically if sx improve would have the pt f/u with her pain doctor and spine. likely needs pt

## 2024-06-01 RX ORDER — OXYCODONE AND ACETAMINOPHEN 5; 325 MG/1; MG/1
1 TABLET ORAL
Qty: 12 | Refills: 0
Start: 2024-06-01

## 2024-06-05 ENCOUNTER — APPOINTMENT (OUTPATIENT)
Dept: INTERNAL MEDICINE | Facility: CLINIC | Age: 64
End: 2024-06-05

## 2024-06-07 ENCOUNTER — APPOINTMENT (OUTPATIENT)
Age: 64
End: 2024-06-07

## 2024-06-18 ENCOUNTER — APPOINTMENT (OUTPATIENT)
Age: 64
End: 2024-06-18

## 2024-06-18 ENCOUNTER — APPOINTMENT (OUTPATIENT)
Age: 64
End: 2024-06-18
Payer: MEDICARE

## 2024-06-18 VITALS
DIASTOLIC BLOOD PRESSURE: 82 MMHG | OXYGEN SATURATION: 97 % | HEART RATE: 94 BPM | WEIGHT: 195 LBS | BODY MASS INDEX: 33.29 KG/M2 | SYSTOLIC BLOOD PRESSURE: 149 MMHG | HEIGHT: 64 IN

## 2024-06-18 DIAGNOSIS — M48.061 SPINAL STENOSIS, LUMBAR REGION WITHOUT NEUROGENIC CLAUDICATION: ICD-10-CM

## 2024-06-18 DIAGNOSIS — M54.50 LOW BACK PAIN, UNSPECIFIED: ICD-10-CM

## 2024-06-18 DIAGNOSIS — M48.062 SPINAL STENOSIS, LUMBAR REGION WITH NEUROGENIC CLAUDICATION: ICD-10-CM

## 2024-06-18 PROCEDURE — 99204 OFFICE O/P NEW MOD 45 MIN: CPT

## 2024-06-18 RX ORDER — DICLOFENAC SODIUM 1% 10 MG/G
1 GEL TOPICAL DAILY
Qty: 1 | Refills: 1 | Status: ACTIVE | COMMUNITY
Start: 2024-06-18 | End: 1900-01-01

## 2024-06-18 RX ORDER — MELOXICAM 7.5 MG/1
7.5 TABLET ORAL
Qty: 28 | Refills: 1 | Status: ACTIVE | COMMUNITY
Start: 2024-06-18 | End: 1900-01-01

## 2024-06-18 NOTE — HISTORY OF PRESENT ILLNESS
[de-identified] : The patient is 64 years old female who has low back pain for 1 months. The patient also has right leg pain. Right leg pain is going down to lateral lower leg. No clear cause of the symptoms.   Pain Level:  9 Duration of Symptoms:  1 month Symptom course:  Same Accident:  No   Previous Treatment:    Pain meds:  Yes, Tylenol and Oxycodone    Physical therapy:  No    Epidural steroid injection:  Yes

## 2024-06-18 NOTE — REASON FOR VISIT
[Initial Visit] : an initial visit for [Back Pain] : back pain [Radiculopathy] : radiculopathy [Spinal Stenosis] : spinal stenosis

## 2024-06-18 NOTE — PHYSICAL EXAM
[de-identified] : The patient is alert, cooperative, and oriented x 3. Pupils are equal and round. The patient does not have skin rash.   Gait is normal. Back ROM is within normal range. Tenderness at PVM. SLR negative. DTR normal range. Motor and sensory are basically normal throughout bilateral L/E. Circulation of legs is normal. Bladder and bowel functions are normal. [de-identified] : Lumbar MRI taken 1 years ago at [facility] shows severe spinal canal stenosis at L4-5 and moderate spinal canal stenosis at L3-4 and L5-S1.

## 2024-06-18 NOTE — DISCUSSION/SUMMARY
[de-identified] : I examined, evaluated, and discussed with the patient. The patient has low back symptoms for 1 month. She has right leg pain and intermittent claudication. MRI shows moderate-severe lumbar spinal canal stenosis at L3-4, L4-5 and L5-S1.  Based on physical exam and image findings, the patient diagnosis is moderate-severe lumbar spinal canal stenosis at L3-4, L4-5 and L5-S1.  Treatment plan is medications PRN and PT. Based on claudication distance and severity of spinal stenosis, I also discussed surgery (She already did SALVADOR which did not work). But her diabetes is not controlled and had cardiac stent surgery recently (on Plavix). Diabetes control recommended.   The patient understands everything and all questions were answered. The patient will return in 4-6 weeks.

## 2024-08-22 NOTE — PATIENT PROFILE ADULT - FUNCTIONAL ASSESSMENT - BASIC MOBILITY SCORE.
Current patient location: [address] [place] [id number]    Is the patient currently in the state of [place]? YES    Visit mode:TELEPHONE    If the visit is dropped, the patient can be reconnected by: TELEPHONE VISIT: Phone number:   Telephone Information:   Mobile [phone]       Will anyone else be joining the visit? NO  (If patient encounters technical issues they should call [phone])    How would you like to obtain your AVS? MyChart    Are changes needed to the allergy or medication list? N/A    Are refills needed on medications prescribed by this physician? NO    Reason for visit: RECHECK     Radha ALFRED  
20

## 2024-12-17 NOTE — ED ADULT TRIAGE NOTE - SOURCE OF INFORMATION
Pulmonary function test and 6-minute walk    Indication: Idiopathic pulmonary fibrosis    The FEV1 is 2.81 (96% of predicted), the FVC is 3.61 (94% of predicted), and the FEV1/FVC ratio was 78%.  The flow volumes and spirometry are normal.  There is no significant response to bronchodilator therapy as neither the FEV1 or FVC increases appreciably.  The total lung capacity is 5.75 (87% of predicted) and the residual volume is 2.14 (84% of predicted).  The lung volumes are normal.  The diffusing capacity is 16.20 (68% of predicted).  The diffusing capacity is mildly reduced.    At rest on room air, the patient's oxygen saturation is 99%, blood pressure 129/78 with a heart rate of 72.  Patient then underwent standard 6-minute walk according protocol at [facility].  At peak exercise his oxygen saturation was 100% on room air, blood pressure 140/87 with a heart rate of 100.  Upon recovery the patient's oxygen saturation was 100% on room air, blood pressure 131/75 with heart rate of 80.  During the patient's 6-minute walk he ambulated 612.8 m or 2010 feet and achieved 3.9 METS.    Overall impression: Isolated mild reduction in the diffusing capacity with normal spirometry and lung volumes.  Since his last pulmonary function test dated June 11, 2024, there have been downward trends in his spirometric values and diffusing capacity.  Patient's oxygenation at rest and with activity is acceptable on room air.  His 6-minute walk distance of 612.8 m or 2010 feet reflects a 7% reduction from his baseline study.  Clinical correlation is advised.    Trevon Pritchard MD  
Patient/EMS

## 2024-12-22 ENCOUNTER — NON-APPOINTMENT (OUTPATIENT)
Age: 64
End: 2024-12-22

## 2024-12-26 ENCOUNTER — EMERGENCY (EMERGENCY)
Facility: HOSPITAL | Age: 64
LOS: 0 days | Discharge: HOME | End: 2024-12-27
Attending: EMERGENCY MEDICINE
Payer: MEDICARE

## 2024-12-26 VITALS
RESPIRATION RATE: 15 BRPM | TEMPERATURE: 99 F | DIASTOLIC BLOOD PRESSURE: 78 MMHG | HEART RATE: 72 BPM | SYSTOLIC BLOOD PRESSURE: 142 MMHG | OXYGEN SATURATION: 100 %

## 2024-12-26 VITALS
DIASTOLIC BLOOD PRESSURE: 84 MMHG | TEMPERATURE: 98 F | HEART RATE: 70 BPM | SYSTOLIC BLOOD PRESSURE: 144 MMHG | HEIGHT: 64 IN | OXYGEN SATURATION: 100 % | WEIGHT: 195.11 LBS | RESPIRATION RATE: 16 BRPM

## 2024-12-26 DIAGNOSIS — Z90.710 ACQUIRED ABSENCE OF BOTH CERVIX AND UTERUS: Chronic | ICD-10-CM

## 2024-12-26 DIAGNOSIS — R51.9 HEADACHE, UNSPECIFIED: ICD-10-CM

## 2024-12-26 DIAGNOSIS — Z98.891 HISTORY OF UTERINE SCAR FROM PREVIOUS SURGERY: Chronic | ICD-10-CM

## 2024-12-26 DIAGNOSIS — G89.29 OTHER CHRONIC PAIN: ICD-10-CM

## 2024-12-26 DIAGNOSIS — E11.9 TYPE 2 DIABETES MELLITUS WITHOUT COMPLICATIONS: ICD-10-CM

## 2024-12-26 DIAGNOSIS — I10 ESSENTIAL (PRIMARY) HYPERTENSION: ICD-10-CM

## 2024-12-26 DIAGNOSIS — G44.209 TENSION-TYPE HEADACHE, UNSPECIFIED, NOT INTRACTABLE: ICD-10-CM

## 2024-12-26 LAB
ALBUMIN SERPL ELPH-MCNC: 3.4 G/DL — SIGNIFICANT CHANGE UP (ref 3.3–5)
ALP SERPL-CCNC: 78 U/L — SIGNIFICANT CHANGE UP (ref 40–120)
ALT FLD-CCNC: 41 U/L — SIGNIFICANT CHANGE UP (ref 12–78)
ANION GAP SERPL CALC-SCNC: 7 MMOL/L — SIGNIFICANT CHANGE UP (ref 5–17)
APTT BLD: 25.1 SEC — SIGNIFICANT CHANGE UP (ref 24.5–35.6)
AST SERPL-CCNC: 20 U/L — SIGNIFICANT CHANGE UP (ref 15–37)
BASOPHILS # BLD AUTO: 0.06 K/UL — SIGNIFICANT CHANGE UP (ref 0–0.2)
BASOPHILS NFR BLD AUTO: 0.9 % — SIGNIFICANT CHANGE UP (ref 0–2)
BILIRUB SERPL-MCNC: 0.4 MG/DL — SIGNIFICANT CHANGE UP (ref 0.2–1.2)
BUN SERPL-MCNC: 12 MG/DL — SIGNIFICANT CHANGE UP (ref 7–23)
CALCIUM SERPL-MCNC: 9 MG/DL — SIGNIFICANT CHANGE UP (ref 8.5–10.1)
CHLORIDE SERPL-SCNC: 104 MMOL/L — SIGNIFICANT CHANGE UP (ref 96–108)
CO2 SERPL-SCNC: 27 MMOL/L — SIGNIFICANT CHANGE UP (ref 22–31)
CREAT SERPL-MCNC: 0.7 MG/DL — SIGNIFICANT CHANGE UP (ref 0.5–1.3)
EGFR: 95 ML/MIN/1.73M2 — SIGNIFICANT CHANGE UP
EOSINOPHIL # BLD AUTO: 0.12 K/UL — SIGNIFICANT CHANGE UP (ref 0–0.5)
EOSINOPHIL NFR BLD AUTO: 1.7 % — SIGNIFICANT CHANGE UP (ref 0–6)
GLUCOSE SERPL-MCNC: 169 MG/DL — HIGH (ref 70–99)
HCT VFR BLD CALC: 35.9 % — SIGNIFICANT CHANGE UP (ref 34.5–45)
HGB BLD-MCNC: 11.7 G/DL — SIGNIFICANT CHANGE UP (ref 11.5–15.5)
IMM GRANULOCYTES NFR BLD AUTO: 0.3 % — SIGNIFICANT CHANGE UP (ref 0–0.9)
INR BLD: 1.08 RATIO — SIGNIFICANT CHANGE UP (ref 0.85–1.16)
LYMPHOCYTES # BLD AUTO: 2.98 K/UL — SIGNIFICANT CHANGE UP (ref 1–3.3)
LYMPHOCYTES # BLD AUTO: 43.1 % — SIGNIFICANT CHANGE UP (ref 13–44)
MCHC RBC-ENTMCNC: 26.9 PG — LOW (ref 27–34)
MCHC RBC-ENTMCNC: 32.6 G/DL — SIGNIFICANT CHANGE UP (ref 32–36)
MCV RBC AUTO: 82.5 FL — SIGNIFICANT CHANGE UP (ref 80–100)
MONOCYTES # BLD AUTO: 0.57 K/UL — SIGNIFICANT CHANGE UP (ref 0–0.9)
MONOCYTES NFR BLD AUTO: 8.2 % — SIGNIFICANT CHANGE UP (ref 2–14)
NEUTROPHILS # BLD AUTO: 3.17 K/UL — SIGNIFICANT CHANGE UP (ref 1.8–7.4)
NEUTROPHILS NFR BLD AUTO: 45.8 % — SIGNIFICANT CHANGE UP (ref 43–77)
NRBC # BLD: 0 /100 WBCS — SIGNIFICANT CHANGE UP (ref 0–0)
PLATELET # BLD AUTO: 393 K/UL — SIGNIFICANT CHANGE UP (ref 150–400)
POTASSIUM SERPL-MCNC: 4.3 MMOL/L — SIGNIFICANT CHANGE UP (ref 3.5–5.3)
POTASSIUM SERPL-SCNC: 4.3 MMOL/L — SIGNIFICANT CHANGE UP (ref 3.5–5.3)
PROT SERPL-MCNC: 7 GM/DL — SIGNIFICANT CHANGE UP (ref 6–8.3)
PROTHROM AB SERPL-ACNC: 12.2 SEC — SIGNIFICANT CHANGE UP (ref 9.9–13.4)
RBC # BLD: 4.35 M/UL — SIGNIFICANT CHANGE UP (ref 3.8–5.2)
RBC # FLD: 13.3 % — SIGNIFICANT CHANGE UP (ref 10.3–14.5)
SODIUM SERPL-SCNC: 138 MMOL/L — SIGNIFICANT CHANGE UP (ref 135–145)
TROPONIN I, HIGH SENSITIVITY RESULT: 5.7 NG/L — SIGNIFICANT CHANGE UP
WBC # BLD: 6.92 K/UL — SIGNIFICANT CHANGE UP (ref 3.8–10.5)
WBC # FLD AUTO: 6.92 K/UL — SIGNIFICANT CHANGE UP (ref 3.8–10.5)

## 2024-12-26 PROCEDURE — 93010 ELECTROCARDIOGRAM REPORT: CPT

## 2024-12-26 PROCEDURE — 71045 X-RAY EXAM CHEST 1 VIEW: CPT | Mod: 26

## 2024-12-26 PROCEDURE — 99285 EMERGENCY DEPT VISIT HI MDM: CPT

## 2024-12-26 PROCEDURE — 70450 CT HEAD/BRAIN W/O DYE: CPT | Mod: 26,MC

## 2024-12-26 RX ORDER — METOCLOPRAMIDE HYDROCHLORIDE 10 MG/1
10 TABLET ORAL ONCE
Refills: 0 | Status: COMPLETED | OUTPATIENT
Start: 2024-12-26 | End: 2024-12-26

## 2024-12-26 RX ORDER — SODIUM CHLORIDE 9 MG/ML
1000 INJECTION, SOLUTION INTRAMUSCULAR; INTRAVENOUS; SUBCUTANEOUS ONCE
Refills: 0 | Status: COMPLETED | OUTPATIENT
Start: 2024-12-26 | End: 2024-12-26

## 2024-12-26 RX ORDER — ACETAMINOPHEN 500MG 500 MG/1
1000 TABLET, COATED ORAL ONCE
Refills: 0 | Status: COMPLETED | OUTPATIENT
Start: 2024-12-26 | End: 2024-12-26

## 2024-12-26 RX ADMIN — SODIUM CHLORIDE 1000 MILLILITER(S): 9 INJECTION, SOLUTION INTRAMUSCULAR; INTRAVENOUS; SUBCUTANEOUS at 22:20

## 2024-12-26 RX ADMIN — METOCLOPRAMIDE HYDROCHLORIDE 10 MILLIGRAM(S): 10 TABLET ORAL at 22:20

## 2024-12-26 RX ADMIN — ACETAMINOPHEN 500MG 400 MILLIGRAM(S): 500 TABLET, COATED ORAL at 22:20

## 2024-12-26 NOTE — ED PROVIDER NOTE - CARE PROVIDERS DIRECT ADDRESSES
[email or web address]

## 2024-12-26 NOTE — ED ADULT NURSE NOTE - CAS TRG GEN SKIN CONDITION
Azelaic Acid Counseling: Patient counseled that medicine may cause skin irritation and to avoid applying near the eyes.  In the event of skin irritation, the patient was advised to reduce the amount of the drug applied or use it less frequently.   The patient verbalized understanding of the proper use and possible adverse effects of azelaic acid.  All of the patient's questions and concerns were addressed. Isotretinoin Counseling: Patient should get monthly blood tests, not donate blood, not drive at night if vision affected, not share medication, and not undergo elective surgery for 6 months after tx completed. Side effects reviewed, pt to contact office should one occur. Topical Retinoid Pregnancy And Lactation Text: This medication is Pregnancy Category C. It is unknown if this medication is excreted in breast milk. Dapsone Pregnancy And Lactation Text: This medication is Pregnancy Category C and is not considered safe during pregnancy or breast feeding. Topical Clindamycin Pregnancy And Lactation Text: This medication is Pregnancy Category B and is considered safe during pregnancy. It is unknown if it is excreted in breast milk. Spironolactone Pregnancy And Lactation Text: This medication can cause feminization of the male fetus and should be avoided during pregnancy. The active metabolite is also found in breast milk. Topical Sulfur Applications Counseling: Topical Sulfur Counseling: Patient counseled that this medication may cause skin irritation or allergic reactions.  In the event of skin irritation, the patient was advised to reduce the amount of the drug applied or use it less frequently.   The patient verbalized understanding of the proper use and possible adverse effects of topical sulfur application.  All of the patient's questions and concerns were addressed. Tetracycline Counseling: Patient counseled regarding possible photosensitivity and increased risk for sunburn.  Patient instructed to avoid sunlight, if possible.  When exposed to sunlight, patients should wear protective clothing, sunglasses, and sunscreen.  The patient was instructed to call the office immediately if the following severe adverse effects occur:  hearing changes, easy bruising/bleeding, severe headache, or vision changes.  The patient verbalized understanding of the proper use and possible adverse effects of tetracycline.  All of the patient's questions and concerns were addressed. Patient understands to avoid pregnancy while on therapy due to potential birth defects. Doxycycline Counseling:  Patient counseled regarding possible photosensitivity and increased risk for sunburn.  Patient instructed to avoid sunlight, if possible.  When exposed to sunlight, patients should wear protective clothing, sunglasses, and sunscreen.  The patient was instructed to call the office immediately if the following severe adverse effects occur:  hearing changes, easy bruising/bleeding, severe headache, or vision changes.  The patient verbalized understanding of the proper use and possible adverse effects of doxycycline.  All of the patient's questions and concerns were addressed. Bactrim Pregnancy And Lactation Text: This medication is Pregnancy Category D and is known to cause fetal risk.  It is also excreted in breast milk. Minocycline Pregnancy And Lactation Text: This medication is Pregnancy Category D and not consider safe during pregnancy. It is also excreted in breast milk. Sarecycline Counseling: Patient advised regarding possible photosensitivity and discoloration of the teeth, skin, lips, tongue and gums.  Patient instructed to avoid sunlight, if possible.  When exposed to sunlight, patients should wear protective clothing, sunglasses, and sunscreen.  The patient was instructed to call the office immediately if the following severe adverse effects occur:  hearing changes, easy bruising/bleeding, severe headache, or vision changes.  The patient verbalized understanding of the proper use and possible adverse effects of sarecycline.  All of the patient's questions and concerns were addressed. Birth Control Pills Counseling: Birth Control Pill Counseling: I discussed with the patient the potential side effects of OCPs including but not limited to increased risk of stroke, heart attack, thrombophlebitis, deep venous thrombosis, hepatic adenomas, breast changes, GI upset, headaches, and depression.  The patient verbalized understanding of the proper use and possible adverse effects of OCPs. All of the patient's questions and concerns were addressed. Isotretinoin Pregnancy And Lactation Text: This medication is Pregnancy Category X and is considered extremely dangerous during pregnancy. It is unknown if it is excreted in breast milk. Azelaic Acid Pregnancy And Lactation Text: This medication is considered safe during pregnancy and breast feeding. Tazorac Counseling:  Patient advised that medication is irritating and drying.  Patient may need to apply sparingly and wash off after an hour before eventually leaving it on overnight.  The patient verbalized understanding of the proper use and possible adverse effects of tazorac.  All of the patient's questions and concerns were addressed. Detail Level: Zone Topical Sulfur Applications Pregnancy And Lactation Text: This medication is Pregnancy Category C and has an unknown safety profile during pregnancy. It is unknown if this topical medication is excreted in breast milk. Doxycycline Pregnancy And Lactation Text: This medication is Pregnancy Category D and not consider safe during pregnancy. It is also excreted in breast milk but is considered safe for shorter treatment courses. Tazorac Pregnancy And Lactation Text: This medication is not safe during pregnancy. It is unknown if this medication is excreted in breast milk. Azithromycin Counseling:  I discussed with the patient the risks of azithromycin including but not limited to GI upset, allergic reaction, drug rash, diarrhea, and yeast infections. Benzoyl Peroxide Counseling: Patient counseled that medicine may cause skin irritation and bleach clothing.  In the event of skin irritation, the patient was advised to reduce the amount of the drug applied or use it less frequently.   The patient verbalized understanding of the proper use and possible adverse effects of benzoyl peroxide.  All of the patient's questions and concerns were addressed. High Dose Vitamin A Counseling: Side effects reviewed, pt to contact office should one occur. Birth Control Pills Pregnancy And Lactation Text: This medication should be avoided if pregnant and for the first 30 days post-partum. Include Pregnancy/Lactation Warning?: No Benzoyl Peroxide Pregnancy And Lactation Text: This medication is Pregnancy Category C. It is unknown if benzoyl peroxide is excreted in breast milk. High Dose Vitamin A Pregnancy And Lactation Text: High dose vitamin A therapy is contraindicated during pregnancy and breast feeding. Azithromycin Pregnancy And Lactation Text: This medication is considered safe during pregnancy and is also secreted in breast milk. Aklief counseling:  Patient advised to apply a pea-sized amount only at bedtime and wait 30 minutes after washing their face before applying.  If too drying, patient may add a non-comedogenic moisturizer.  The most commonly reported side effects including irritation, redness, scaling, dryness, stinging, burning, itching, and increased risk of sunburn.  The patient verbalized understanding of the proper use and possible adverse effects of retinoids.  All of the patient's questions and concerns were addressed. Erythromycin Counseling:  I discussed with the patient the risks of erythromycin including but not limited to GI upset, allergic reaction, drug rash, diarrhea, increase in liver enzymes, and yeast infections. Winlevi Counseling:  I discussed with the patient the risks of topical clascoterone including but not limited to erythema, scaling, itching, and stinging. Patient voiced their understanding. Erythromycin Pregnancy And Lactation Text: This medication is Pregnancy Category B and is considered safe during pregnancy. It is also excreted in breast milk. Spironolactone Counseling: Patient advised regarding risks of diarrhea, abdominal pain, hyperkalemia, birth defects (for female patients), liver toxicity and renal toxicity. The patient may need blood work to monitor liver and kidney function and potassium levels while on therapy. The patient verbalized understanding of the proper use and possible adverse effects of spironolactone.  All of the patient's questions and concerns were addressed. Topical Clindamycin Counseling: Patient counseled that this medication may cause skin irritation or allergic reactions.  In the event of skin irritation, the patient was advised to reduce the amount of the drug applied or use it less frequently.   The patient verbalized understanding of the proper use and possible adverse effects of clindamycin.  All of the patient's questions and concerns were addressed. Dapsone Counseling: I discussed with the patient the risks of dapsone including but not limited to hemolytic anemia, agranulocytosis, rashes, methemoglobinemia, kidney failure, peripheral neuropathy, headaches, GI upset, and liver toxicity.  Patients who start dapsone require monitoring including baseline LFTs and weekly CBCs for the first month, then every month thereafter.  The patient verbalized understanding of the proper use and possible adverse effects of dapsone.  All of the patient's questions and concerns were addressed. Aklief Pregnancy And Lactation Text: It is unknown if this medication is safe to use during pregnancy.  It is unknown if this medication is excreted in breast milk.  Breastfeeding women should use the topical cream on the smallest area of the skin for the shortest time needed while breastfeeding.  Do not apply to nipple and areola. Winlevi Pregnancy And Lactation Text: This medication is considered safe during pregnancy and breastfeeding. Topical Retinoid counseling:  Patient advised to apply a pea-sized amount only at bedtime and wait 30 minutes after washing their face before applying.  If too drying, patient may add a non-comedogenic moisturizer. The patient verbalized understanding of the proper use and possible adverse effects of retinoids.  All of the patient's questions and concerns were addressed. Bactrim Counseling:  I discussed with the patient the risks of sulfa antibiotics including but not limited to GI upset, allergic reaction, drug rash, diarrhea, dizziness, photosensitivity, and yeast infections.  Rarely, more serious reactions can occur including but not limited to aplastic anemia, agranulocytosis, methemoglobinemia, blood dyscrasias, liver or kidney failure, lung infiltrates or desquamative/blistering drug rashes. Warm Minocycline Counseling: Patient advised regarding possible photosensitivity and discoloration of the teeth, skin, lips, tongue and gums.  Patient instructed to avoid sunlight, if possible.  When exposed to sunlight, patients should wear protective clothing, sunglasses, and sunscreen.  The patient was instructed to call the office immediately if the following severe adverse effects occur:  hearing changes, easy bruising/bleeding, severe headache, or vision changes.  The patient verbalized understanding of the proper use and possible adverse effects of minocycline.  All of the patient's questions and concerns were addressed.

## 2024-12-26 NOTE — ED ADULT NURSE NOTE - NSFALLRISKINTERV_ED_ALL_ED

## 2024-12-26 NOTE — ED PROVIDER NOTE - CARE PROVIDER_API CALL
Zo Dunn  Neurology  [address] [place] [address]  Phone: [phone]  Fax: [phone]  Follow Up Time: 1-3 Days

## 2024-12-26 NOTE — ED ADULT NURSE NOTE - NS ED NURSE IV DC DT
Department of Anesthesiology  Preprocedure Note       Name:  Tracy Rouse   Age:  35 y.o.  :  1989                                          MRN:  [id number]         Date:  2024      Surgeon: Surgeon(s):  Adriana Solares MD    Procedure: Procedure(s):  HYSTEROSCOPY, NOVASURE \"SPEC POP\"    Medications prior to admission:   Prior to Admission medications    Medication Sig Start Date End Date Taking? Authorizing Provider   ibuprofen (ADVIL;MOTRIN) 400 MG tablet Take 2 tablets by mouth every 6 hours as needed for Pain   Yes Provider, MD Di   Biotin w/ Vitamins C & E 1250-7.5-7.5 MCG-MG-UNT CHEW Take by mouth Daily   Yes ProviderDi MD   NOVOLOG 100 UNIT/ML injection vial Inject into the skin Indications: Type II DM. via [employer]tr[employer] insulin pump   Yes Provider, MD Di   albuterol sulfate HFA (PROVENTIL;VENTOLIN;PROAIR) 108 (90 Base) MCG/ACT inhaler Inhale 1 puff into the lungs every 4 hours as needed for Shortness of Breath Indications: hx asthma   Yes Automatic Reconciliation, Ar       Current medications:    Current Facility-Administered Medications   Medication Dose Route Frequency Provider Last Rate Last Admin   • 0.9 % sodium chloride infusion   IntraVENous Continuous Adriana Solares  mL/hr at 24 0839 New Bag at 24 0839   • glucose chewable tablet 16 g  4 tablet Oral PRN Riky Loera MD       • dextrose bolus 10% 125 mL  125 mL IntraVENous PRN Riky Loera MD        Or   • dextrose bolus 10% 250 mL  250 mL IntraVENous PRN Riky Loera MD       • glucagon injection 1 mg  1 mg SubCUTAneous PRN Riky Loera MD       • dextrose 10 % infusion   IntraVENous Continuous PRN Riky Loera MD           Allergies:    Allergies   Allergen Reactions   • Iodine Hives     Pt states topical ok, only IV contrast       Problem List:    Patient Active Problem List   Diagnosis Code   • Closed C6 fracture (HCC) S12.500A   • Abnormal uterine bleeding (AUB) N93.9 
27-Dec-2024 00:48

## 2024-12-26 NOTE — ED ADULT NURSE NOTE - OBJECTIVE STATEMENT
Pt AOx4, ambulatory w/ steady gait. Pt presents to ED w/ c/o frontal headache x past 5 days. Pt also c/o L sided CP which started yesterday evening. Pt states CP starts L pectoral area, radiates to L under arm. Pt denies n/v/diarrhea/fever/chills/SOB, denies visual changes/dizziness. Pt states she went to UC 5 days ago, was told to return to ED if headache persisted. Per pt, PMH HTN, DM2, HLD, cardiac stent placed May 2024.

## 2024-12-26 NOTE — ED PROVIDER NOTE - OBJECTIVE STATEMENT
64-year-old female with past medical history of hypertension, diabetes, chronic back pain presents to ER due to headache frontal x 2 to 3 days.  Patient states that she otherwise has not had any change in vision no focal neurodeficits otherwise.  Was seen in urgent care and sent into ED for further evaluation.

## 2024-12-26 NOTE — ED PROVIDER NOTE - PATIENT PORTAL LINK FT
You can access the FollowMyHealth Patient Portal offered by [facility] by registering at the following website: [email or web address]. By joining [employer]’s FollowMyHealth portal, you will also be able to view your health information using other applications (apps) compatible with our system.

## 2024-12-26 NOTE — ED ADULT TRIAGE NOTE - CHIEF COMPLAINT QUOTE
Intense Headaches since Thursday and getting worse, pain now more on left side. slight pain left chest area since  yesterday.  hx cardiac stents, htn, DM

## 2024-12-26 NOTE — ED PROVIDER NOTE - CLINICAL SUMMARY MEDICAL DECISION MAKING FREE TEXT BOX
Patient with labs and head CT performed with no noted acute findings.  Will discharge with further follow-up with outpatient neurology if headache persists.  Did improve while in ED with IV Tylenol and Reglan given.     EKG sinus no ST elevation/depression

## 2024-12-27 NOTE — ED ADULT TRIAGE NOTE - AS TEMP SITE
Continued Stay SW/CM Assessment/Plan of Care Note       Active Substitute Decision Maker (SDM)    There are no active Substitute Decision Maker (SDM) on file.           Progress note:    2024----Plan of Care Updates---S.w. contacted patient spouse/ Mariah Keen  @ [[phone]]  re DME  [patient bed mattress]  She spoke with [employer], DME vendor and patient don't qualify for a new bed/ warranty / potential [ $200.00 ] cost for a new mattress. Per pt. Wife she is looking for resources by her own. She  will f/up with an agency called [employer].  She states she off tomorrow and will be able to search around. She states her  will be discharge soon and she have to take of dme as soon as possible. To f/up and plan accordingly..       oral

## 2025-04-30 NOTE — ED ADULT NURSE NOTE - NS ED NURSE RECORD ANOTHER VITAL SIGN
support group.  If you're not ready to make changes right now, try to pick a date in the future. Then make an appointment with your doctor to talk about when and how you'll get started with a plan.  Follow-up care is a key part of your treatment and safety. Be sure to make and go to all appointments, and call your doctor if you are having problems. It's also a good idea to know your test results and keep a list of the medicines you take.  How can you care for yourself as you start a weight-loss plan?  Set realistic goals. Many people expect to lose much more weight than is likely. A weight loss of 5% to 10% of your body weight may be enough to improve your health.  Get family and friends involved to provide support. Talk to them about why you are trying to lose weight, and ask them to help. They can help by participating in exercise and having meals with you, even if they may be eating something different.  Find what works best for you. If you do not have time or do not like to cook, a program that offers meal replacement bars or shakes may be better for you. Or if you like to prepare meals, finding a plan that includes daily menus and recipes may be best.  Ask your doctor about other health professionals who can help you achieve your weight-loss goals.  A dietitian can help you make healthy changes in your diet.  An exercise specialist or  can help you develop a safe and effective exercise program.  A counselor or psychiatrist can help you cope with issues such as depression, anxiety, or family problems that can make it hard to focus on weight loss.  Consider joining a support group for people who are trying to lose weight. Your doctor can suggest groups in your area.  Where can you learn more?  Go to https://www.healthwise.net/patientEd and enter U357 to learn more about \"Starting a Weight-Loss Plan: Care Instructions.\"  Current as of: April 30, 2024  Content Version: 14.4  © 9389-4059 Alex  Yes
